# Patient Record
Sex: FEMALE | Race: WHITE | NOT HISPANIC OR LATINO | Employment: FULL TIME | URBAN - METROPOLITAN AREA
[De-identification: names, ages, dates, MRNs, and addresses within clinical notes are randomized per-mention and may not be internally consistent; named-entity substitution may affect disease eponyms.]

---

## 2022-11-14 ENCOUNTER — HOSPITAL ENCOUNTER (EMERGENCY)
Facility: HOSPITAL | Age: 37
Discharge: HOME | End: 2022-11-14
Attending: INTERNAL MEDICINE
Payer: MEDICAID

## 2022-11-14 VITALS
RESPIRATION RATE: 18 BRPM | HEIGHT: 60 IN | OXYGEN SATURATION: 99 % | WEIGHT: 152 LBS | HEART RATE: 69 BPM | TEMPERATURE: 98 F | BODY MASS INDEX: 29.84 KG/M2 | SYSTOLIC BLOOD PRESSURE: 150 MMHG | DIASTOLIC BLOOD PRESSURE: 102 MMHG

## 2022-11-14 DIAGNOSIS — R20.0 NUMBNESS AND TINGLING OF RIGHT ARM: ICD-10-CM

## 2022-11-14 DIAGNOSIS — M54.12 CERVICAL RADICULOPATHY: ICD-10-CM

## 2022-11-14 DIAGNOSIS — R20.2 NUMBNESS AND TINGLING OF RIGHT ARM: ICD-10-CM

## 2022-11-14 DIAGNOSIS — B34.9 ACUTE VIRAL SYNDROME: Primary | ICD-10-CM

## 2022-11-14 LAB
FLUAV AG UPPER RESP QL IA.RAPID: NOT DETECTED
FLUBV AG UPPER RESP QL IA.RAPID: NOT DETECTED
SARS-COV-2 RNA RESP QL NAA+PROBE: NOT DETECTED

## 2022-11-14 PROCEDURE — 96372 THER/PROPH/DIAG INJ SC/IM: CPT

## 2022-11-14 PROCEDURE — 99284 EMERGENCY DEPT VISIT MOD MDM: CPT

## 2022-11-14 PROCEDURE — 0240U COVID/FLU A&B PCR: CPT

## 2022-11-14 PROCEDURE — 63600175 PHARM REV CODE 636 W HCPCS

## 2022-11-14 RX ORDER — KETOROLAC TROMETHAMINE 30 MG/ML
30 INJECTION, SOLUTION INTRAMUSCULAR; INTRAVENOUS
Status: COMPLETED | OUTPATIENT
Start: 2022-11-14 | End: 2022-11-14

## 2022-11-14 RX ORDER — ALPRAZOLAM 1 MG/1
1 TABLET ORAL 2 TIMES DAILY PRN
COMMUNITY
Start: 2022-11-03

## 2022-11-14 RX ORDER — MIRTAZAPINE 30 MG/1
30 TABLET, FILM COATED ORAL NIGHTLY
COMMUNITY
Start: 2022-10-27

## 2022-11-14 RX ORDER — KETOROLAC TROMETHAMINE 10 MG/1
10 TABLET, FILM COATED ORAL EVERY 6 HOURS PRN
Qty: 20 TABLET | Refills: 0 | Status: SHIPPED | OUTPATIENT
Start: 2022-11-14 | End: 2022-11-19

## 2022-11-14 RX ORDER — NORETHINDRONE AND ETHINYL ESTRADIOL 1 MG-35MCG
1 KIT ORAL EVERY MORNING
Status: ON HOLD | COMMUNITY
Start: 2022-10-15 | End: 2023-06-19 | Stop reason: HOSPADM

## 2022-11-14 RX ORDER — BUPROPION HYDROCHLORIDE 150 MG/1
150 TABLET ORAL EVERY MORNING
COMMUNITY
Start: 2022-10-27

## 2022-11-14 RX ORDER — FLUTICASONE PROPIONATE 50 MCG
1 SPRAY, SUSPENSION (ML) NASAL 2 TIMES DAILY PRN
Qty: 15 G | Refills: 0 | Status: SHIPPED | OUTPATIENT
Start: 2022-11-14

## 2022-11-14 RX ORDER — CYCLOBENZAPRINE HCL 10 MG
10 TABLET ORAL 3 TIMES DAILY PRN
Qty: 15 TABLET | Refills: 0 | Status: SHIPPED | OUTPATIENT
Start: 2022-11-14 | End: 2022-11-19

## 2022-11-14 RX ORDER — CITALOPRAM 40 MG/1
40 TABLET, FILM COATED ORAL DAILY
COMMUNITY
Start: 2022-10-27

## 2022-11-14 RX ADMIN — KETOROLAC TROMETHAMINE 30 MG: 30 INJECTION, SOLUTION INTRAMUSCULAR at 05:11

## 2022-11-14 NOTE — Clinical Note
"Rin"Rivera Shaver was seen and treated in our emergency department on 11/14/2022.  She may return to work on 11/15/2022.  Covid/flu negative today.     If you have any questions or concerns, please don't hesitate to call.      Mariah MUSE    "

## 2022-11-14 NOTE — DISCHARGE INSTRUCTIONS
Thanks for letting us take care of you today!  It is our goal to give you courteous care and to keep you comfortable and informed, if you have any questions before you leave I will be happy to try and answer them.    Here is some advice after your visit:      Your visit in the emergency department is NOT definitive care - please follow-up with your primary care doctor and/or specialist within 1-2 days.  Please return if you have any worsening in your condition or if you have any other concerns.    If you had radiology exams like an XRAY or CT in the emergency Department the interpreation on them may be preliminary - there may be less time sensitive findings on the reports please obtain these reports within 24 hours from the hospital or by using your out on your mobile phone to access records.  Bring these to your primary care doctor and/or specialist for further review of incidental findings.    Please review any LAB WORK from your visit today with your primary care physician.

## 2022-11-14 NOTE — ED PROVIDER NOTES
"Encounter Date: 11/14/2022       History     Chief Complaint   Patient presents with    sinus congestion     Pt to er c/o sinus congestion onset yesterday. Pt also c/o fatigue and right arm tingling.     Patient is a 37 year old female who presents to ER with nasal congestion x 2 days. She denies sob, cough, fever, or chills. She also reports "heaviness" in her right arm that started after she took a nap in her car for work. She denies chest pain or sob.     The history is provided by the patient. No  was used.   Illness   The current episode started yesterday. The problem has been unchanged. The pain is at a severity of 0/10. Nothing relieves the symptoms. Nothing aggravates the symptoms. Associated symptoms include congestion and rhinorrhea. Pertinent negatives include no fever, no decreased vision, no nausea, no vomiting, no ear pain, no hearing loss, no mouth sores, no sore throat, no cough, no shortness of breath, no rash, no pain and no eye redness.   Review of patient's allergies indicates:   Allergen Reactions    Ondansetron Hives and Rash     Other reaction(s): Hives    Penicillins Rash     Past Medical History:   Diagnosis Date    Depression      No past surgical history on file.  No family history on file.     Review of Systems   Constitutional:  Negative for fever.   HENT:  Positive for congestion and rhinorrhea. Negative for ear pain, hearing loss, mouth sores and sore throat.    Eyes:  Negative for pain and redness.   Respiratory:  Negative for cough and shortness of breath.    Cardiovascular:  Negative for chest pain.   Gastrointestinal:  Negative for nausea and vomiting.   Genitourinary:  Negative for dysuria.   Musculoskeletal:  Negative for arthralgias, back pain and myalgias.   Skin:  Negative for rash.   Neurological:  Negative for weakness.   Hematological:  Does not bruise/bleed easily.   All other systems reviewed and are negative.    Physical Exam     Initial Vitals " [11/14/22 1552]   BP Pulse Resp Temp SpO2   (!) 150/102 69 18 97.7 °F (36.5 °C) 99 %      MAP       --         Physical Exam    Nursing note and vitals reviewed.  Constitutional: She appears well-developed and well-nourished.   HENT:   Head: Normocephalic.   Right Ear: Hearing and tympanic membrane normal.   Left Ear: Hearing and tympanic membrane normal.   Mouth/Throat: Uvula is midline, oropharynx is clear and moist and mucous membranes are normal.   Neck:   Normal range of motion.   Full passive range of motion without pain.     Cardiovascular:  Regular rhythm, normal heart sounds and normal pulses.           Pulmonary/Chest: Effort normal and breath sounds normal.   Musculoskeletal:      Right upper arm: Normal.      Left upper arm: Normal.      Right elbow: Normal.      Left elbow: Normal.      Right wrist: Normal.      Left wrist: Normal.      Right hand: Normal.      Left hand: Normal.      Cervical back: Full passive range of motion without pain and normal range of motion.     Lymphadenopathy:     She has no cervical adenopathy.   Neurological: She is alert. GCS eye subscore is 4. GCS verbal subscore is 5. GCS motor subscore is 6.   Skin: Skin is warm, dry and intact. Capillary refill takes less than 2 seconds.       ED Course   Procedures  Labs Reviewed   COVID/FLU A&B PCR - Normal    Narrative:     The Xpert Xpress SARS-CoV-2/FLU/RSV plus is a rapid, multiplexed real-time PCR test intended for the simultaneous qualitative detection and differentiation of SARS-CoV-2, Influenza A, Influenza B, and respiratory syncytial virus (RSV) viral RNA in either nasopharyngeal swab or nasal swab specimens.                Imaging Results              X-Ray Cervical Spine AP And Lateral (Final result)  Result time 11/14/22 17:17:13      Final result by Lisa Turner MD (11/14/22 17:17:13)                   Impression:      Some loss of the normal lordotic curve cervical spine possibly related to spasm otherwise  unremarkable      Electronically signed by: Lisa Morenolubna  Date:    11/14/2022  Time:    17:17               Narrative:    EXAMINATION:  XR CERVICAL SPINE AP LATERAL    CLINICAL HISTORY:  Anesthesia of skin    TECHNIQUE:  AP, lateral and open mouth views of the cervical spine were performed.    COMPARISON:  None    FINDINGS:  The vertebral body heights are well maintained.  There is some loss of the normal lordotic curve possibly related to spasm no fracture there is seen.  No dislocation is seen.  Odontoid lateral masses appear grossly unremarkable.  No soft tissue abnormality is seen.                                       Medications   ketorolac injection 30 mg (30 mg Intramuscular Given 11/14/22 3739)     Medical Decision Making:   Initial Assessment:   Awake and alert, NAD  Differential Diagnosis:   Cervical radiculopathy, URI, viral illness, covid, flu  Clinical Tests:   Lab Tests: Ordered and Reviewed  Radiological Study: Ordered and Reviewed  ED Management:  Patient to Er with nasal congestion x 2 days. She denies fever or chills. Also reports intermittent heaviness in right arm that started after she slept in her car. She has equal strength in both arms and no drift. Symptoms have improved since being in ER. Will dc home with flexeril and flonase. She is agreeable with plan and will follow up with her pcp.                         Clinical Impression:   Final diagnoses:  [R20.0, R20.2] Numbness and tingling of right arm  [B34.9] Acute viral syndrome (Primary)  [M54.12] Cervical radiculopathy        ED Disposition Condition    Discharge Stable          ED Prescriptions       Medication Sig Dispense Start Date End Date Auth. Provider    ketorolac (TORADOL) 10 mg tablet Take 1 tablet (10 mg total) by mouth every 6 (six) hours as needed for Pain. 20 tablet 11/14/2022 11/19/2022 Rosy Simmons NP    cyclobenzaprine (FLEXERIL) 10 MG tablet Take 1 tablet (10 mg total) by mouth 3 (three) times daily as needed  for Muscle spasms. 15 tablet 11/14/2022 11/19/2022 Rosy Simmons NP    fluticasone propionate (FLONASE) 50 mcg/actuation nasal spray 1 spray (50 mcg total) by Each Nostril route 2 (two) times daily as needed for Rhinitis. 15 g 11/14/2022 -- Rosy Simmons NP          Follow-up Information       Follow up With Specialties Details Why Contact Info    Lorrie Miner NP Family Medicine Schedule an appointment as soon as possible for a visit   [address]   [facility] [address]  [phone]               Rosy Simmons NP  11/14/22 6978

## 2023-06-12 ENCOUNTER — LAB VISIT (OUTPATIENT)
Dept: LAB | Facility: HOSPITAL | Age: 38
End: 2023-06-12
Attending: OBSTETRICS & GYNECOLOGY
Payer: MEDICAID

## 2023-06-12 DIAGNOSIS — Z01.818 OTHER SPECIFIED PRE-OPERATIVE EXAMINATION: ICD-10-CM

## 2023-06-12 DIAGNOSIS — Z01.818 OTHER SPECIFIED PRE-OPERATIVE EXAMINATION: Primary | ICD-10-CM

## 2023-06-12 LAB
BASOPHILS # BLD AUTO: 0.01 X10(3)/MCL
BASOPHILS NFR BLD AUTO: 0.1 %
EOSINOPHIL # BLD AUTO: 0.3 X10(3)/MCL (ref 0–0.9)
EOSINOPHIL NFR BLD AUTO: 4.3 %
ERYTHROCYTE [DISTWIDTH] IN BLOOD BY AUTOMATED COUNT: 12.4 % (ref 11.5–17)
HCT VFR BLD AUTO: 44.4 % (ref 37–47)
HGB BLD-MCNC: 14.8 G/DL (ref 12–16)
IMM GRANULOCYTES # BLD AUTO: 0.02 X10(3)/MCL (ref 0–0.04)
IMM GRANULOCYTES NFR BLD AUTO: 0.3 %
LYMPHOCYTES # BLD AUTO: 2.65 X10(3)/MCL (ref 0.6–4.6)
LYMPHOCYTES NFR BLD AUTO: 37.7 %
MCH RBC QN AUTO: 29.9 PG (ref 27–31)
MCHC RBC AUTO-ENTMCNC: 33.3 G/DL (ref 33–36)
MCV RBC AUTO: 89.7 FL (ref 80–94)
MONOCYTES # BLD AUTO: 0.42 X10(3)/MCL (ref 0.1–1.3)
MONOCYTES NFR BLD AUTO: 6 %
NEUTROPHILS # BLD AUTO: 3.63 X10(3)/MCL (ref 2.1–9.2)
NEUTROPHILS NFR BLD AUTO: 51.6 %
NRBC BLD AUTO-RTO: 0 %
PLATELET # BLD AUTO: 266 X10(3)/MCL (ref 130–400)
PMV BLD AUTO: 9.3 FL (ref 7.4–10.4)
RBC # BLD AUTO: 4.95 X10(6)/MCL (ref 4.2–5.4)
WBC # SPEC AUTO: 7.03 X10(3)/MCL (ref 4.5–11.5)

## 2023-06-12 PROCEDURE — 85025 COMPLETE CBC W/AUTO DIFF WBC: CPT

## 2023-06-12 PROCEDURE — 36415 COLL VENOUS BLD VENIPUNCTURE: CPT

## 2023-06-15 NOTE — DISCHARGE INSTRUCTIONS
Patient Education        Fallopian Tube Removal Discharge Instructions       What care is needed at home?   Be sure to wash your hands before and after touching your wound or dressing. You have dermabond (skin glue). You may wash your incision with soap and water. The dermabond (skin glue) may fall off within 1-2 weeks. You do not have to peel it off. Do not pick at it (infection prevention).  You may take showers 24 hours after your surgery. You should wash between your legs with soap and water very well to reduce your chance of infection.   Do not lift anything over 10 pounds (4.5 kg). Avoid activities like heavy lifting and hard exercise.   Do not use tampons, douche, or have sex for 2 weeks following your surgery or until told so by your doctor.  You can expect some bleeding from your vagina for a few weeks. You may use sanitary pads but not tampons.  Your bowel movements may take some time to get back to normal. Eat small meals high in fiber to avoid hard stools. Drink 6 to 8 glasses of water each day.  Try to walk each day. Start by walking a little more than you did the day before. Walking boosts blood flow and helps prevent lung, belly, and blood problems.  Talk with your doctor about safe sex as you can still be exposed to sexually transmitted diseases.  Use a small pillow to put pressure on your belly. The pressure can make you more comfortable when you cough, laugh, or do other actions.     What follow-up care is needed?   Be sure to keep your follow up visit.     Will physical activity be limited?   Rest for the first few days after the procedure. Talk to your doctor about the right amount of activity for you.    What problems could happen?   Infertility if both fallopian tubes were removed  Infection  Wound opening  Bleeding  Blood clots in your legs or lungs  Injury to nearby organs  Risk for an ectopic pregnancy    When do I need to call the doctor?   Signs of infection such as a fever of 100.4°F  (38°C) or higher, chills, pain with passing urine, wound that will not heal, or anal itching  Signs of wound infection such as swelling, redness, warmth around the wound; too much pain when touched; yellowish, greenish, or bloody discharge; foul smell coming from the cut site; cut site opens up  Lots of blood in your sanitary pads or more than 6 soaked pads per day  Upset stomach, throwing up, or very bad belly pain  No bowel movement after 3 days  You feel the need to pass urine but it will not come out even after 6 hours  Smelly, green, or dark yellow vaginal discharge  Feeling short of breath  Pain or swelling in one or both legs

## 2023-06-16 RX ORDER — LIDOCAINE HYDROCHLORIDE 10 MG/ML
1 INJECTION, SOLUTION EPIDURAL; INFILTRATION; INTRACAUDAL; PERINEURAL ONCE AS NEEDED
Status: CANCELLED | OUTPATIENT
Start: 2023-06-19 | End: 2034-11-14

## 2023-06-16 RX ORDER — SODIUM CHLORIDE 9 MG/ML
INJECTION, SOLUTION INTRAVENOUS CONTINUOUS
Status: CANCELLED | OUTPATIENT
Start: 2023-06-19

## 2023-06-18 PROBLEM — Z30.2 ADMISSION FOR STERILIZATION: Status: ACTIVE | Noted: 2023-06-18

## 2023-06-18 NOTE — H&P
History & Physical  Gynecology    SUBJECTIVE:     Chief Complaint/Reason for Admission: Laparoscopic Bilateral Tubal Fulguration    History of Present Illness:  Patient is a 38 y.o.  who presents with undesired future fertility and desires permanent sterilization.     No medications prior to admission.       Review of patient's allergies indicates:   Allergen Reactions    Amoxicillin     Penicillins Rash       Past medical history:  Past Medical History:   Diagnosis Date    Anxiety     Depression         Past surgical history:  Past Surgical History:   Procedure Laterality Date     SECTION      x3    FEMUR SURGERY          Social History     Tobacco Use    Smoking status: Every Day     Types: Vaping with nicotine    Smokeless tobacco: Never   Substance Use Topics    Alcohol use: Never    Drug use: Never       Review of Systems:  Constitutional: no fever or chills  Respiratory: no cough or shortness of breath  Cardiovascular: no chest pain or palpitations  Genitourinary: no hematuria or dysuria  Neuro:No headaches, dizziness or blurry vision.     OBJECTIVE:     Vital Signs (Most Recent):       Physical Exam:  General: well developed, well nourished  Lungs:  clear to auscultation bilaterally and normal respiratory effort  Cardiovascular: Heart with regular rate and rhythm.    Abdomen: soft and non tender  Back: no CVAT   Neuro: cranial nerves 2-12 grossly intact.  Pelvic:   NFEG w/o lesions noted. Small mobile UT, no masses noted  Extremities: normal ROM, no homans or palpable cords.     Laboratory:  Lab Results   Component Value Date    WBC 7.03 2023    HGB 14.8 2023    HCT 44.4 2023    MCV 89.7 2023     2023       Ultrasound:  No results found for this or any previous visit.          ASSESSMENT/PLAN:     Active Hospital Problems    Diagnosis  POA    *Admission for sterilization [Z30.2]  Not Applicable      Resolved Hospital Problems   No resolved problems to  display.       To OR for LBTF    Preop labs and studies ordered.    NPO 8 hours prior to surgery.     Medications reviewed.     Risks and benefits explained in detail to patient, including but not limited to damage to internal organs, including but not limited to bowel, bladder, uterus, tubes, ovaries, nerves, arteries, veins, possible need for blood transfusion.  Patient understands that for all practical purposes that this procedure in not reversible. There is also a risk of failing sterility and this can be as high as 1:200. All questions answered. Patient is aware of all risks and desires surgery. Consents signed.

## 2023-06-19 ENCOUNTER — ANESTHESIA (OUTPATIENT)
Dept: SURGERY | Facility: HOSPITAL | Age: 38
End: 2023-06-19
Payer: MEDICAID

## 2023-06-19 ENCOUNTER — ANESTHESIA EVENT (OUTPATIENT)
Dept: SURGERY | Facility: HOSPITAL | Age: 38
End: 2023-06-19
Payer: MEDICAID

## 2023-06-19 ENCOUNTER — HOSPITAL ENCOUNTER (OUTPATIENT)
Facility: HOSPITAL | Age: 38
Discharge: HOME | End: 2023-06-19
Attending: OBSTETRICS & GYNECOLOGY | Admitting: OBSTETRICS & GYNECOLOGY
Payer: MEDICAID

## 2023-06-19 DIAGNOSIS — Z30.2 ADMISSION FOR STERILIZATION: Primary | ICD-10-CM

## 2023-06-19 LAB
B-HCG UR QL: NEGATIVE
CTP QC/QA: YES

## 2023-06-19 PROCEDURE — 37000008 HC ANESTHESIA 1ST 15 MINUTES: Performed by: OBSTETRICS & GYNECOLOGY

## 2023-06-19 PROCEDURE — 63600175 PHARM REV CODE 636 W HCPCS: Performed by: STUDENT IN AN ORGANIZED HEALTH CARE EDUCATION/TRAINING PROGRAM

## 2023-06-19 PROCEDURE — D9220A PRA ANESTHESIA: ICD-10-PCS | Mod: ANES,,, | Performed by: STUDENT IN AN ORGANIZED HEALTH CARE EDUCATION/TRAINING PROGRAM

## 2023-06-19 PROCEDURE — 25000003 PHARM REV CODE 250: Performed by: NURSE ANESTHETIST, CERTIFIED REGISTERED

## 2023-06-19 PROCEDURE — 71000016 HC POSTOP RECOV ADDL HR: Performed by: OBSTETRICS & GYNECOLOGY

## 2023-06-19 PROCEDURE — 36000708 HC OR TIME LEV III 1ST 15 MIN: Performed by: OBSTETRICS & GYNECOLOGY

## 2023-06-19 PROCEDURE — 25000003 PHARM REV CODE 250: Performed by: STUDENT IN AN ORGANIZED HEALTH CARE EDUCATION/TRAINING PROGRAM

## 2023-06-19 PROCEDURE — D9220A PRA ANESTHESIA: Mod: ANES,,, | Performed by: STUDENT IN AN ORGANIZED HEALTH CARE EDUCATION/TRAINING PROGRAM

## 2023-06-19 PROCEDURE — 63600175 PHARM REV CODE 636 W HCPCS: Performed by: OBSTETRICS & GYNECOLOGY

## 2023-06-19 PROCEDURE — 81025 URINE PREGNANCY TEST: CPT | Performed by: OBSTETRICS & GYNECOLOGY

## 2023-06-19 PROCEDURE — D9220A PRA ANESTHESIA: ICD-10-PCS | Mod: CRNA,,, | Performed by: NURSE ANESTHETIST, CERTIFIED REGISTERED

## 2023-06-19 PROCEDURE — 25000003 PHARM REV CODE 250: Performed by: OBSTETRICS & GYNECOLOGY

## 2023-06-19 PROCEDURE — 71000015 HC POSTOP RECOV 1ST HR: Performed by: OBSTETRICS & GYNECOLOGY

## 2023-06-19 PROCEDURE — 36000709 HC OR TIME LEV III EA ADD 15 MIN: Performed by: OBSTETRICS & GYNECOLOGY

## 2023-06-19 PROCEDURE — D9220A PRA ANESTHESIA: Mod: CRNA,,, | Performed by: NURSE ANESTHETIST, CERTIFIED REGISTERED

## 2023-06-19 PROCEDURE — 37000009 HC ANESTHESIA EA ADD 15 MINS: Performed by: OBSTETRICS & GYNECOLOGY

## 2023-06-19 PROCEDURE — 71000033 HC RECOVERY, INTIAL HOUR: Performed by: OBSTETRICS & GYNECOLOGY

## 2023-06-19 PROCEDURE — 63600175 PHARM REV CODE 636 W HCPCS: Performed by: NURSE ANESTHETIST, CERTIFIED REGISTERED

## 2023-06-19 RX ORDER — DIPHENHYDRAMINE HYDROCHLORIDE 50 MG/ML
25 INJECTION INTRAMUSCULAR; INTRAVENOUS EVERY 6 HOURS PRN
Status: DISCONTINUED | OUTPATIENT
Start: 2023-06-19 | End: 2023-06-19 | Stop reason: HOSPADM

## 2023-06-19 RX ORDER — HYDROMORPHONE HYDROCHLORIDE 2 MG/ML
0.4 INJECTION, SOLUTION INTRAMUSCULAR; INTRAVENOUS; SUBCUTANEOUS EVERY 10 MIN PRN
Status: DISCONTINUED | OUTPATIENT
Start: 2023-06-19 | End: 2023-06-19 | Stop reason: HOSPADM

## 2023-06-19 RX ORDER — ONDANSETRON 4 MG/1
8 TABLET, ORALLY DISINTEGRATING ORAL EVERY 8 HOURS PRN
Status: DISCONTINUED | OUTPATIENT
Start: 2023-06-19 | End: 2023-06-19 | Stop reason: HOSPADM

## 2023-06-19 RX ORDER — BUPIVACAINE HYDROCHLORIDE AND EPINEPHRINE 5; 5 MG/ML; UG/ML
INJECTION, SOLUTION EPIDURAL; INTRACAUDAL; PERINEURAL
Status: DISCONTINUED | OUTPATIENT
Start: 2023-06-19 | End: 2023-06-19 | Stop reason: HOSPADM

## 2023-06-19 RX ORDER — OXYCODONE AND ACETAMINOPHEN 5; 325 MG/1; MG/1
1 TABLET ORAL EVERY 6 HOURS PRN
Qty: 5 TABLET | Refills: 0 | OUTPATIENT
Start: 2023-06-19 | End: 2023-08-04

## 2023-06-19 RX ORDER — PROCHLORPERAZINE EDISYLATE 5 MG/ML
5 INJECTION INTRAMUSCULAR; INTRAVENOUS EVERY 6 HOURS PRN
Status: DISCONTINUED | OUTPATIENT
Start: 2023-06-19 | End: 2023-06-19 | Stop reason: HOSPADM

## 2023-06-19 RX ORDER — MIDAZOLAM HYDROCHLORIDE 1 MG/ML
2 INJECTION INTRAMUSCULAR; INTRAVENOUS ONCE
Status: COMPLETED | OUTPATIENT
Start: 2023-06-19 | End: 2023-06-19

## 2023-06-19 RX ORDER — DIPHENHYDRAMINE HYDROCHLORIDE 50 MG/ML
INJECTION INTRAMUSCULAR; INTRAVENOUS
Status: DISCONTINUED | OUTPATIENT
Start: 2023-06-19 | End: 2023-06-19

## 2023-06-19 RX ORDER — SCOLOPAMINE TRANSDERMAL SYSTEM 1 MG/1
1 PATCH, EXTENDED RELEASE TRANSDERMAL
Status: DISCONTINUED | OUTPATIENT
Start: 2023-06-19 | End: 2023-06-19 | Stop reason: HOSPADM

## 2023-06-19 RX ORDER — GLYCOPYRROLATE 0.2 MG/ML
INJECTION INTRAMUSCULAR; INTRAVENOUS
Status: DISCONTINUED | OUTPATIENT
Start: 2023-06-19 | End: 2023-06-19

## 2023-06-19 RX ORDER — MEPERIDINE HYDROCHLORIDE 25 MG/ML
12.5 INJECTION INTRAMUSCULAR; INTRAVENOUS; SUBCUTANEOUS EVERY 10 MIN PRN
Status: DISCONTINUED | OUTPATIENT
Start: 2023-06-19 | End: 2023-06-19 | Stop reason: HOSPADM

## 2023-06-19 RX ORDER — KETOROLAC TROMETHAMINE 30 MG/ML
INJECTION, SOLUTION INTRAMUSCULAR; INTRAVENOUS
Status: DISCONTINUED | OUTPATIENT
Start: 2023-06-19 | End: 2023-06-19

## 2023-06-19 RX ORDER — SODIUM CHLORIDE, SODIUM LACTATE, POTASSIUM CHLORIDE, CALCIUM CHLORIDE 600; 310; 30; 20 MG/100ML; MG/100ML; MG/100ML; MG/100ML
INJECTION, SOLUTION INTRAVENOUS CONTINUOUS
Status: DISCONTINUED | OUTPATIENT
Start: 2023-06-19 | End: 2023-06-19 | Stop reason: HOSPADM

## 2023-06-19 RX ORDER — PROPOFOL 10 MG/ML
VIAL (ML) INTRAVENOUS
Status: DISCONTINUED | OUTPATIENT
Start: 2023-06-19 | End: 2023-06-19

## 2023-06-19 RX ORDER — IBUPROFEN 800 MG/1
800 TABLET ORAL EVERY 8 HOURS PRN
Qty: 30 TABLET | Refills: 2 | Status: SHIPPED | OUTPATIENT
Start: 2023-06-19

## 2023-06-19 RX ORDER — BUPIVACAINE HYDROCHLORIDE 5 MG/ML
INJECTION, SOLUTION EPIDURAL; INTRACAUDAL
Status: DISCONTINUED
Start: 2023-06-19 | End: 2023-06-19 | Stop reason: HOSPADM

## 2023-06-19 RX ORDER — ONDANSETRON HYDROCHLORIDE 2 MG/ML
INJECTION, SOLUTION INTRAMUSCULAR; INTRAVENOUS
Status: DISCONTINUED | OUTPATIENT
Start: 2023-06-19 | End: 2023-06-19

## 2023-06-19 RX ORDER — TRANEXAMIC ACID 100 MG/ML
INJECTION, SOLUTION INTRAVENOUS
Status: COMPLETED
Start: 2023-06-19 | End: 2023-06-19

## 2023-06-19 RX ORDER — METOCLOPRAMIDE HYDROCHLORIDE 5 MG/ML
10 INJECTION INTRAMUSCULAR; INTRAVENOUS EVERY 10 MIN PRN
Status: DISCONTINUED | OUTPATIENT
Start: 2023-06-19 | End: 2023-06-19 | Stop reason: HOSPADM

## 2023-06-19 RX ORDER — TRANEXAMIC ACID 100 MG/ML
INJECTION, SOLUTION INTRAVENOUS
Status: DISCONTINUED | OUTPATIENT
Start: 2023-06-19 | End: 2023-06-19

## 2023-06-19 RX ORDER — DEXAMETHASONE SODIUM PHOSPHATE 4 MG/ML
INJECTION, SOLUTION INTRA-ARTICULAR; INTRALESIONAL; INTRAMUSCULAR; INTRAVENOUS; SOFT TISSUE
Status: DISCONTINUED | OUTPATIENT
Start: 2023-06-19 | End: 2023-06-19

## 2023-06-19 RX ORDER — ACETAMINOPHEN 325 MG/1
650 TABLET ORAL EVERY 4 HOURS PRN
Status: DISCONTINUED | OUTPATIENT
Start: 2023-06-19 | End: 2023-06-19 | Stop reason: HOSPADM

## 2023-06-19 RX ORDER — PROCHLORPERAZINE EDISYLATE 5 MG/ML
5 INJECTION INTRAMUSCULAR; INTRAVENOUS EVERY 30 MIN PRN
Status: DISCONTINUED | OUTPATIENT
Start: 2023-06-19 | End: 2023-06-19

## 2023-06-19 RX ORDER — HYDROCODONE BITARTRATE AND ACETAMINOPHEN 10; 325 MG/1; MG/1
1 TABLET ORAL EVERY 4 HOURS PRN
Status: DISCONTINUED | OUTPATIENT
Start: 2023-06-19 | End: 2023-06-19 | Stop reason: HOSPADM

## 2023-06-19 RX ORDER — HYDROCODONE BITARTRATE AND ACETAMINOPHEN 5; 325 MG/1; MG/1
1 TABLET ORAL EVERY 4 HOURS PRN
Status: DISCONTINUED | OUTPATIENT
Start: 2023-06-19 | End: 2023-06-19 | Stop reason: HOSPADM

## 2023-06-19 RX ORDER — HYDROMORPHONE HYDROCHLORIDE 2 MG/ML
1 INJECTION, SOLUTION INTRAMUSCULAR; INTRAVENOUS; SUBCUTANEOUS EVERY 6 HOURS PRN
Status: DISCONTINUED | OUTPATIENT
Start: 2023-06-19 | End: 2023-06-19 | Stop reason: HOSPADM

## 2023-06-19 RX ORDER — FENTANYL CITRATE 50 UG/ML
INJECTION, SOLUTION INTRAMUSCULAR; INTRAVENOUS
Status: DISCONTINUED | OUTPATIENT
Start: 2023-06-19 | End: 2023-06-19

## 2023-06-19 RX ORDER — METHOCARBAMOL 100 MG/ML
1000 INJECTION, SOLUTION INTRAMUSCULAR; INTRAVENOUS ONCE
Status: COMPLETED | OUTPATIENT
Start: 2023-06-19 | End: 2023-06-19

## 2023-06-19 RX ORDER — ROCURONIUM BROMIDE 10 MG/ML
INJECTION, SOLUTION INTRAVENOUS
Status: DISCONTINUED | OUTPATIENT
Start: 2023-06-19 | End: 2023-06-19

## 2023-06-19 RX ORDER — LIDOCAINE HYDROCHLORIDE 10 MG/ML
INJECTION, SOLUTION EPIDURAL; INFILTRATION; INTRACAUDAL; PERINEURAL
Status: DISCONTINUED | OUTPATIENT
Start: 2023-06-19 | End: 2023-06-19

## 2023-06-19 RX ADMIN — SODIUM CHLORIDE, POTASSIUM CHLORIDE, SODIUM LACTATE AND CALCIUM CHLORIDE: 600; 310; 30; 20 INJECTION, SOLUTION INTRAVENOUS at 09:06

## 2023-06-19 RX ADMIN — FENTANYL CITRATE 25 MCG: 50 INJECTION, SOLUTION INTRAMUSCULAR; INTRAVENOUS at 10:06

## 2023-06-19 RX ADMIN — KETOROLAC TROMETHAMINE 30 MG: 30 INJECTION, SOLUTION INTRAMUSCULAR at 10:06

## 2023-06-19 RX ADMIN — ONDANSETRON 4 MG: 2 INJECTION INTRAMUSCULAR; INTRAVENOUS at 10:06

## 2023-06-19 RX ADMIN — MIDAZOLAM HYDROCHLORIDE 2 MG: 1 INJECTION, SOLUTION INTRAMUSCULAR; INTRAVENOUS at 09:06

## 2023-06-19 RX ADMIN — DEXAMETHASONE SODIUM PHOSPHATE 8 MG: 4 INJECTION, SOLUTION INTRA-ARTICULAR; INTRALESIONAL; INTRAMUSCULAR; INTRAVENOUS; SOFT TISSUE at 09:06

## 2023-06-19 RX ADMIN — SCOPOLAMINE 1 PATCH: 1 PATCH TRANSDERMAL at 08:06

## 2023-06-19 RX ADMIN — DIPHENHYDRAMINE HYDROCHLORIDE 12.5 MG: 50 INJECTION INTRAMUSCULAR; INTRAVENOUS at 10:06

## 2023-06-19 RX ADMIN — LIDOCAINE HYDROCHLORIDE 50 MG: 10 INJECTION, SOLUTION EPIDURAL; INFILTRATION; INTRACAUDAL; PERINEURAL at 09:06

## 2023-06-19 RX ADMIN — SUGAMMADEX 200 MG: 100 INJECTION, SOLUTION INTRAVENOUS at 10:06

## 2023-06-19 RX ADMIN — GLYCOPYRROLATE 0.2 MG: 0.2 INJECTION INTRAMUSCULAR; INTRAVENOUS at 09:06

## 2023-06-19 RX ADMIN — METHOCARBAMOL 1000 MG: 100 INJECTION INTRAMUSCULAR; INTRAVENOUS at 11:06

## 2023-06-19 RX ADMIN — HYDROCODONE BITARTRATE AND ACETAMINOPHEN 1 TABLET: 5; 325 TABLET ORAL at 12:06

## 2023-06-19 RX ADMIN — PROPOFOL 200 MG: 10 INJECTION, EMULSION INTRAVENOUS at 09:06

## 2023-06-19 RX ADMIN — FENTANYL CITRATE 25 MCG: 50 INJECTION, SOLUTION INTRAMUSCULAR; INTRAVENOUS at 09:06

## 2023-06-19 RX ADMIN — TRANEXAMIC ACID 1000 MG: 100 INJECTION, SOLUTION INTRAVENOUS at 10:06

## 2023-06-19 RX ADMIN — ROCURONIUM BROMIDE 40 MG: 50 INJECTION INTRAVENOUS at 09:06

## 2023-06-19 NOTE — TRANSFER OF CARE
Anesthesia Transfer of Care Note    Patient: Rin Shaver    Procedure(s) Performed: Procedure(s) (LRB):  LIGATION, FALLOPIAN TUBE, LAPAROSCOPIC Bilateral (Bilateral)    Patient location: PACU    Anesthesia Type: general    Transport from OR: Transported from OR on room air with adequate spontaneous ventilation    Post pain: adequate analgesia    Post assessment: no apparent anesthetic complications    Post vital signs: stable    Level of consciousness: sedated    Nausea/Vomiting: no nausea/vomiting    Complications: none    Transfer of care protocol was followed      Last vitals:   Visit Vitals  /66   Pulse 70   Temp 36.7 °C (98.1 °F)   Resp 16   Ht 5' (1.524 m)   Wt 68.3 kg (150 lb 9.2 oz)   LMP 04/26/2023 (Approximate)   SpO2 100%   Breastfeeding No   BMI 29.41 kg/m²

## 2023-06-19 NOTE — PLAN OF CARE
Pt is [id number] score 10-she meets criteria for phase2 care per dr velasquez-to rm 6 via bed w/ tricia

## 2023-06-19 NOTE — DISCHARGE SUMMARY
[facility]  Discharge Note/Brief Operative Note     SUMMARY     Surgery Date: 6/19/2023     Surgeon(s) and Role:     * Nathaniel Logan DO - Primary    Assisting Surgeon: None    Pre-op Diagnosis:  Sterilization [Z30.2]    Post-op Diagnosis:  Post-Op Diagnosis Codes:     * Sterilization [Z30.2]    Procedure(s) (LRB):  LIGATION, FALLOPIAN TUBE, LAPAROSCOPIC Bilateral (Bilateral)    Anesthesia: General    Findings/Key Components:  Normal pelvic anatomy.  Omental adhesion partially midway between the symphysis pubis and umbilicus in the midline.  Filmy adhesions with no bowel involved.  Also the right-hand side of the uterus was more adherent to the pelvic sidewall on the right-hand side.    Estimated Blood Loss: 1cc         Specimens:   Specimen (24h ago, onward)      None            Discharge Note    SUMMARY     Admit Date: 6/19/2023    Discharge Date and Time: No discharge date for patient encounter.    Attending Physician: Nathaniel Logan DO     Discharge Provider: Nathaniel Logan    Final Diagnosis: Post-Op Diagnosis Codes:     * Sterilization [Z30.2]    Disposition: Patient tolerated the procedure well. To Home or Self Care, discharged in good condition    Discharge meds: Ibuprofen 800gm and Norco 5/325, see printed scripts.    Follow Up/Patient Instructions:    Follow-up Information       Nathaniel Logan DO Follow up in 2 week(s).    Specialty: Obstetrics and Gynecology  Why: Post-op, patient already has appointment scheduled  Contact information:  [address] [facility]  [address]  [facility] [id number] [phone]                          Follow up in office at scheduled post op visit. Pelvic rest for 2 weeks.     Medications:  Reconciled Home Medications:   Current Discharge Medication List        START taking these medications    Details   ibuprofen (ADVIL,MOTRIN) 800 MG tablet Take 1 tablet (800 mg total) by mouth every 8 (eight) hours as needed for Pain (Pain  and cramping).  Qty: 30 tablet, Refills: 2      oxyCODONE-acetaminophen (PERCOCET) 5-325 mg per tablet Take 1 tablet by mouth every 6 (six) hours as needed for Pain (pain not relieved by ibuprofen).  Qty: 5 tablet, Refills: 0    Comments: Quantity prescribed more than 7 day supply? No           CONTINUE these medications which have NOT CHANGED    Details   ALPRAZolam (XANAX) 1 MG tablet Take 1 mg by mouth 2 (two) times daily as needed.      buPROPion (WELLBUTRIN XL) 150 MG TB24 tablet Take 150 mg by mouth every morning.      citalopram (CELEXA) 40 MG tablet Take 40 mg by mouth once daily.      mirtazapine (REMERON) 30 MG tablet Take 30 mg by mouth every evening.      fluticasone propionate (FLONASE) 50 mcg/actuation nasal spray 1 spray (50 mcg total) by Each Nostril route 2 (two) times daily as needed for Rhinitis.  Qty: 15 g, Refills: 0           STOP taking these medications       medroxyprogesterone (DEPO-SUBQ PROVERA) 104 mg/0.65 mL injection Comments:   Reason for Stopping:         NORTREL 1/35, 28, 1-35 mg-mcg per tablet Comments:   Reason for Stopping:             Discharge Procedure Orders   Diet general     Pelvic Rest   Order Comments: For two weeks     Call MD for:  temperature >100.4     Call MD for:  persistent nausea and vomiting     Call MD for:  severe uncontrolled pain     Call MD for:  difficulty breathing, headache or visual disturbances     Call MD for:  redness, tenderness, or signs of infection (pain, swelling, redness, odor or green/yellow discharge around incision site)     Call MD for:  hives     Call MD for:  persistent dizziness or light-headedness     Activity as tolerated

## 2023-06-19 NOTE — ANESTHESIA PROCEDURE NOTES
Intubation    Date/Time: 6/19/2023 9:48 AM  Performed by: Nery Daugherty CRNA  Authorized by: Dudley Rodriguez MD     Intubation:     Induction:  Intravenous    Intubated:  Postinduction    Mask Ventilation:  Easy mask    Attempts:  1    Attempted By:  CRNA    Method of Intubation:  Direct    Blade:  Robertson 2    Laryngeal View Grade: Grade I - full view of cords      Difficult Airway Encountered?: No      Complications:  None    Airway Device:  Oral endotracheal tube    Airway Device Size:  7.0    Style/Cuff Inflation:  Cuffed (inflated to minimal occlusive pressure)    Tube secured:  20    Secured at:  The lips    Placement Verified By:  Capnometry    Complicating Factors:  None    Findings Post-Intubation:  BS equal bilateral and atraumatic/condition of teeth unchanged

## 2023-06-19 NOTE — OP NOTE
OPERATIVE NOTE       SUMMARY      Surgery Date: 2023      SURGEON: Nathaniel Logan    ASSISTANT: Staff Scrub    PREOP DIAGNOSIS:  39yo  with undesired future fertility and desires permanent sterilization.    POSTOP DIAGNOSIS:  Same as above    PROCEDURES:  Laparoscopic Bilateral Tubal Fulguration    ANESTHESIA:  GETA    FINDINGS/KEY COMPONENTS: Normal pelvic anatomy.  Omental adhesion partially midway between the symphysis pubis and umbilicus in the midline.  Filmy adhesions with no bowel involved.  Also the right-hand side of the uterus was more adherent to the pelvic sidewall on the right-hand side.    PROCEDURE IN DETAIL:  After ensuring an informed consent the patient was taken to the operating room where general anesthesia was administered.  She was placed in a dorsal lithotomy position employing the adjustable Devyn stirrups.  She was prepped and draped in the usual sterile fashion.  Bladder was drained of urine with a flexible catheter.  Time-out was completed.  A sponge stick was placed in the vagina and attention was turned to the infraumbilical region where a 5 mm vertical skin incision was made within the umbilical fold and a 5 mm trocar and port were placed under direct visualization using the Ethicon bladeless system.  No evidence of entry damage noted.  Pneumoperitoneum was obtained with CO2 gas to maximum pressure of 15 mmHg.  A 2nd port was placed in the midline just above the symphysis pubis it was placed under direct visualization with no evidence of entry damage noted.  The above findings were noted.  Both incision sites were pre prepped with 1.5 cc of 0.5% Marcaine with epinephrine.  Attention was turned to the left fallopian tube and the Kleppinger was used to fulgurate the tube to complete desiccation in 3 contiguous spots started in the mid ampullary region going towards the fimbriated end.  No viable tissue noted in between the burn sites.  Excellent hemostasis was  appreciated.  The same procedure was carried out on the contralateral side without difficulty.  At this time all instruments removed and the CO2 gas was allowed to escape.  The trocars were removed and the incisions were reapproximated using 4-O Monocryl in a subcuticular fashion and then covered with a thin layer of Dermabond.  The sponge stick was removed from the vagina and the patient was cleansed of all blood and Betadine, taken out of dorsal lithotomy position awoken from anesthesia and taken to recovery room in stable condition.  Lap sponge instrument needle counts were correct x3.    ESTIMATED BLOOD LOSS: 1cc    Fluids:800cc    Urine Output: 200cc    COMPLICATIONS: none    PATHOLOGY:   Specimens (From admission, onward)      None

## 2023-06-19 NOTE — PROGRESS NOTES
Op site without dressing x 2 trocar sites, surrounding area soft and warm to the touch, color WDL.

## 2023-06-19 NOTE — INTERVAL H&P NOTE
I have seen the patient and reviewed this note, and there is no change in history and physical since the last exam. Urine pregnancy test is negative. Consents are signed and on the chart. Patients questions have been answered. She wants to proceed with permanent sterilization. Will proceed with laparoscopic bilateral tubal ligation.

## 2023-06-19 NOTE — ANESTHESIA POSTPROCEDURE EVALUATION
"Reason For Visit  Arina Nassar is a 76year old right handed male patient with a chief complaint of memory loss, word finding , anxiety, LBP. :  services not used. A chaperone is not applicable. He is unaccompanied. Referred By: Dr Ronald Lowry   Address:. [address]  [place], [facility]  . .. Phone #:. [phone]. Fax #:. [phone], [phone]. Quality    Communication CI communication of results to PCP: Dr. Ronald Lowry. History of Present Illness  77 yo RH retired  with h/o HTN, CARLOS, BPH and lumbar degenerative spine disease, with 3 year h/o memory concern (late onset age 70). Several RF for mood changes/ anxiety, LBP, CARLOS, fatigue. --low bach pain improved with duloxetine 60 mg a day  --less anxious on duloxetine as well  --word finding delays ongoing issue  --BP high 163/90 mmHg today  --reviewed mri brain 1 year follow up and results on formal np testing as below  --very concerned about his status, that is not what he "" was previously capable of\""   --still independent in all IADL and ADL-- helps with grandkids  --reports compliance with CPAP    Personal history:  He is  with 1 child and had grandchildren. He is retired , graduate school, , lives at home with spouse, an IM physician actively practicing. No family h/o dementia. Cognitive   Not knowing date/time: yes (occ). Not knowing location: no ().    change in short-term memory: yes (frq). Difficulty remembering recent events: no ().    difficulty with word finding: yes (frq). Forgetting names: yes (occ). Misplacing/losing items: yes (frq). Repeating stories/questions: yes (occ). Getting lost in familiar place: no (). Difficulty following a conversation or TV program: no ().    difficulty following instructions: yes (occ). Difficulty with basic arithmetic: no ().     No poor judgement (dangerous actions, excess spending, etc.): no " Anesthesia Post Evaluation    Patient: Rin Shaver    Procedure(s) Performed: Procedure(s) (LRB):  LIGATION, FALLOPIAN TUBE, LAPAROSCOPIC Bilateral (Bilateral)    Final Anesthesia Type: general      Patient location during evaluation: PACU  Patient participation: Yes- Able to Participate  Level of consciousness: awake and alert  Post-procedure vital signs: reviewed and stable  Pain management: adequate  Airway patency: patent    PONV status at discharge: No PONV  Anesthetic complications: no      Respiratory status: unassisted  Hydration status: euvolemic  Follow-up not needed.          Vitals Value Taken Time   /70 06/19/23 1230   Temp 36.7 °C (98.1 °F) 06/19/23 1110   Pulse 72 06/19/23 1230   Resp 18 06/19/23 1230   SpO2 99 % 06/19/23 1230         Event Time   Out of Recovery 11:19:00         Pain/Harvinder Score: Pain Rating Prior to Med Admin: 6 (6/19/2023 12:04 PM)  Harvinder Score: 10 (6/19/2023 12:30 PM)         ().       Behavior   Delusions: none. Hallucinations: none. Agitation/Aggression: occasional.   Depression: none. Anxiety: frequent. Elation/Euphoria: none. Apathy: none. Disinhibition: none. Irritability: frequent. Motor Behavior: rare. Sleep: none. Appetite: none. Instrumental ADL's   Driving: yes. Public Transportation: Independent. Shopping: Independent. Household Chores: Independent. Cooking: Independent. Paying bills: Independent. Medication management: Independent. Telephone: Independent. Laundry: Independent. Socializing: Independent. Personal ADL's   Dressing: Independent. Bathing: Independent. Personal Grooming: Independent. Toileting: Independent. Eating: Independent. Review of Systems    Const: feeling tired . Skin: Normal.   Eyes: dryness of the eyes. ENT: Normal.   CV: Normal.   Resp: Normal.   GI: Normal.   Endo: Normal.   : feelings of urinary urgency and nocturia. Musc: back pain . back pain better. Neuro: numbness and loss of balance. Psych: anxiety. Allergies   1. No Known Drug Allergies   Recorded By: Jack Roach; 4/27/2017 4:00:27 PM    Current Meds   1. ALPRAZolam 0.5 MG Oral Tablet; 1 tab daily; Therapy: (Méndez Gavia) to Recorded   2. Aspirin 81 MG TABS; 1 daily; Therapy: (Méndez Gavia) to Recorded   3. CoQ-10 100 MG Oral Capsule Extended Release; as directed; Therapy: (Méndez Gavia) to Recorded   4. DULoxetine HCl - 60 MG Oral Capsule Delayed Release Particles; TAKE 1 CAPSULE   BEDTIME; Therapy: [id number] to (Evaluate:[id number])  Requested for: 01Sep2017; Last   Rx:01Sep2017 Ordered   5. Fish Oil CAPS; 1tab daily; Therapy: (Darrell Hernandez) to Recorded   6. Labetalol HCl - 200 MG Oral Tablet; TAKE 1 TABLET TWICE DAILY; Therapy: [id number] to ([id number]); Last Rx:[id number] Ordered   7. Omega 3 500 500 MG Oral Capsule; 1 tab daily;    Therapy: (Méndez Gavia) to Recorded   8. Terazosin HCl - 10 MG Oral Capsule; TAKE 1 CAPSULE AT BEDTIME NIGHTLY; Therapy: (Lindajo Jay) to Recorded   9. Valsartan 80 MG Oral Tablet; TAKE 1 TABLET QHS; Last Rx:2017 Ordered   10. Vitamin C ER 1000 MG Oral Tablet Extended Release; TAKE 1 TABLET DAILY; Therapy: (Lindajo Jay) to Recorded   11. Vitamin D3 5000 UNIT Oral Tablet; TAKE 1 TABLET DAILY; Therapy: (Odilia rCaig) to Recorded    Active Problems   1. Anxiety (F41.9)   2. Benign essential HTN (I10)   3. Cerebral microvascular disease (I67.9)   4. Dyslipidemia (E78.5)   5. Lumbosacral spondylosis with radiculopathy (M47.27)   6. Macrocytosis (D75.89)   7. Memory loss (R41.3)   8. Mood change (F39)   9. Obesity (BMI 30-39.9) (E66.9)   10. CARLOS on CPAP (G47.33,Z99.89)   11. Sleep disturbance (G47.9)   12. Tiredness (R53.83)    Surgical History   1. History of Appendectomy   2. History of Cholecystectomy   3. History of Sinus Surgery    Family History  Mother    1. Family history of   Father    2. Family history of COPD, mild   3. Family history of    4. Family history of heart disease (Z82.49)  Sister    11. Family history of arthritis (Z82.61)   6. Family history of malignant neoplasm of breast (Z80.3)  Cousin    7.  Family history of heart disease (Z82.49)    Social History   Â· Activities: Swimming   Â· Drinks coffee   Â· Engages in hobby   Â· Exercises occasionally (Z78.9)   Â· Language difficulty (F80.9)   Â· Lives with wife   Â·    Â· Minimum alcohol consumption   Â· No guns in home   Â· No illicit drug use   Â· Non-smoker (Z78.9)   Â· One child   Â· Retired    Vitals  Signs   Recorded: [id number] 02:16PM   Height: 6 ft 2 in  Weight: 264 lb 12.32 oz  BMI Calculated: 33.99  BSA Calculated: 8.63  Systolic: 759, LUE, Sitting  Diastolic: 90, LUE, Sitting  Heart Rate: 78    Testing Scores  Test Scores    [id number]   MMSE 28 27   MoCA  20   Geriatric Depression Scale 6 7     Physical Exam "  GENERAL EXAM  Constitutional: No acute distress, well appearing. Reports reduction in low back pain, more comfortable  Cardiovascular: Auscultation of heart: no murmur. NEUROLOGIC EXAM    MENTAL STATUS  see testing scores  good historian   concerned about his cognition   feels like "" average in not good for him\""  insight preserved   occasional word finding difficulties noted during exam  calmer    CRANIAL NERVES  II: Pupils equal and reactive to light. III,IV, VI: Extraocular movements full. V: Facial sensation normal.  VII: Facial sensation and symmetry normal.  VIII: hearing normal.  IX, X: Palate elevation normal.  XI: SCM and trapezius strength: normal.  XII: tongue protruded in midline. MUSCULOSKELETAL EXAM  Muscle tone in upper and lower extremities: normal-- improved-- had massage and therapy for low back pain and muscle pain  Muscle strength in upper and lower extremities 5/5. COORDINATION  Finger to Nose normal bilaterally, no ataxia. Extrapyramidal: none  Tremor: no tremor detected, but notes sometimes has tremor in hands    SENSATION    vibration sensation loss up to both hips    REFLEXES  UE:Biceps,Triceps, Brachioradialis: bilaterally tr+  LE:Patella bilaterally 0+ , Ankle Jerk: bilaterally 0  Plantar responses: bilaterally flexor    GAIT AND STATION  Gait: steady, no assisted devices, less back pain and no antalgia  Freezing of gait: absent  Postural instability on a pull test: absent  Romberg: sways without falling  Tandem: abnormal                   Results/Data    LAbs: 17-- uric acid 7.4 ( mild H), T chol 213 (H),  (H)  TSH 1.69, free T3 3.1, free T4 0.7 (low nl), homocysteine 10, vit D 66.98, B12 522 (nl), hgbA1c 5.8, Creatinine 1.1, BUN 16, Hgb 14.6, Hct 43.7 nl    Neuropsychological testin17----> done by dr. Jefrey Seip -- only language impaired (expressive language: confrontational naming and verbal fluency).  Otherwise learning, memory, visuospatial skills, " attention, processing speed, working memory, were intact. Mood was significant depression and anxiety. Language deficits were of uncertain etiology (nonnative [place] speaker). [id number] 12:06PM   MRI BRAIN WO CON   MRI BRAIN WO CON: Accession #    [id number]    EXAM: Noncontrast MRI examination of the brain    CLINICAL INDICATION: Memory loss. TECHNIQUE: Multiplanar, multiecho images were obtained of the brain without the infusion of   intravenous contrast.    COMPARISON: 07/11/2016. FINDINGS: There is age-appropriate cerebral and cerebellar atrophy without midline shift or   mass effect. There are no abnormal collections of intra-axial or extra-axial blood or fluid. T1-weighted sagittal images show the midline structures to be intact. The craniovertebral   and cervical medullary junctions are normal. There is no restricted intracranial diffusion. Intracranial flow voids are unremarkable. Scattered regions of abnormal high T2 signal are   noted within the supratentorial white matter, deep gray nuclei bilaterally, kimber, and right   cerebellar hemisphere. There is no MRI evidence of intracranial hemorrhage, mass, or acute   infarction. The mastoid air cells and paranasal sinuses are well aerated. IMPRESSION:  1. There are no acute intracranial abnormalities. Age-appropriate cerebral and cerebellar atrophy   are identified with chronic supratentorial white matter ischemic demyelination. ****  F I N A L  ****    Transcribed By: LEV   09/14/17 12:35 pm    Dictated By:            Jeferson Hollis    Electronically Reviewed and Approved By:           Jeferson Hollis  09/14/17 12:38  pm   Assessment   1. CARLOS on CPAP (G47.33,Z99.89)   2. Cerebral microvascular disease (I67.9)   3. Anxiety (F41.9)   4. Memory loss (R41.3)    Impression  75 yo RH retired  with h/o HTN, BPH, lumbar degenerative spine disease and treated CARLOS and 3 years of forgetting, associated anxiety. At this time the formal NP testing mostly concerning for language issues, although uncertain if affected by [place] s a second language or true cognitive impairment. Remaining domains scored within average range. This is perceived by patient as a failure due to self assessment as of great drop from prior level, which may very well be true and if this process in ongoing then 1 year f/u Neuropsychological testing may be helpful. On the other hand the brain MRI changes consisting of a moderate burden of chronic ischemic microvascular changes have progressed between 2016 and 2017 are concerning and would warrant better attention and BP control. The vacular brain disease may be cause of cognitive decline and could lead to vascular dementia in the future, hence vascular RF modification would be critical here. Plan   Â· Donepezil HCl - 5 MG Oral Tablet; TAKE 1 TABLET BEDTIME         Other Recommendations:     continue duloxetine 60 mg a day    improve BP control, BP < 140/90 mmHg    weight reduction and increase physical activity    trial of donepezil 5 mg at bedtime, script printed, more of a supportive therapy due to disturbance in function with cognitive symptoms     redo NP testing in 1 year    MRI brain in 1 year also may be considered for a follow up on progression of vascular brain disease     continue antiplatelet agent ASA  mg a day    patient reports no issues with lipids    asked to share note with dr. Yash Scott [phone]    Return to Clinic   6 months. Time    Total face to face time spent with patient 40 minutes. Greater than 50% of the total time was spent counseling and/or coordinating care.       Signatures   Electronically signed by : Farhana Noyola MD; Nov 2 2017  5:08PM CST (Author)

## 2023-06-19 NOTE — ANESTHESIA PREPROCEDURE EVALUATION
2023  Rin Shaver is a 38 y.o., female.    Other Medical History   Depression Anxiety     Surgical History   SECTION FEMUR SURGERY       Pre-op Assessment    I have reviewed the Patient Summary Reports.     I have reviewed the Nursing Notes. I have reviewed the NPO Status.   I have reviewed the Medications.     Review of Systems  Anesthesia Hx:   Denies Personal Hx of Anesthesia complications.   Cardiovascular:   Exercise tolerance: good    Pulmonary:  Pulmonary Normal    Hepatic/GI:  Hepatic/GI Normal    Neurological:  Neurology Normal    Psych:   Psychiatric History depression          Physical Exam  General: Well nourished, Cooperative and Alert    Airway:  Mallampati: II   Mouth Opening: Normal  TM Distance: Normal  Tongue: Normal    Dental:  Intact    Chest/Lungs:  Normal Respiratory Rate        Anesthesia Plan  Type of Anesthesia, risks & benefits discussed:    Anesthesia Type: Gen ETT  Intra-op Monitoring Plan: Standard ASA Monitors  Post Op Pain Control Plan: multimodal analgesia  Induction:  IV  Informed Consent: Informed consent signed with the Patient and all parties understand the risks and agree with anesthesia plan.  All questions answered.   ASA Score: 2  Day of Surgery Review of History & Physical: H&P Update referred to the surgeon/provider.    Ready For Surgery From Anesthesia Perspective.     .

## 2023-06-20 VITALS
HEIGHT: 60 IN | RESPIRATION RATE: 18 BRPM | OXYGEN SATURATION: 99 % | SYSTOLIC BLOOD PRESSURE: 140 MMHG | HEART RATE: 72 BPM | DIASTOLIC BLOOD PRESSURE: 70 MMHG | BODY MASS INDEX: 29.56 KG/M2 | TEMPERATURE: 98 F | WEIGHT: 150.56 LBS

## 2023-08-03 PROCEDURE — 99284 EMERGENCY DEPT VISIT MOD MDM: CPT

## 2023-08-04 ENCOUNTER — HOSPITAL ENCOUNTER (EMERGENCY)
Facility: HOSPITAL | Age: 38
Discharge: HOME | End: 2023-08-04
Attending: EMERGENCY MEDICINE
Payer: MEDICAID

## 2023-08-04 VITALS
RESPIRATION RATE: 18 BRPM | OXYGEN SATURATION: 97 % | BODY MASS INDEX: 29.84 KG/M2 | TEMPERATURE: 98 F | HEART RATE: 67 BPM | SYSTOLIC BLOOD PRESSURE: 115 MMHG | WEIGHT: 152 LBS | DIASTOLIC BLOOD PRESSURE: 63 MMHG | HEIGHT: 60 IN

## 2023-08-04 DIAGNOSIS — M25.552 ACUTE HIP PAIN, LEFT: Primary | ICD-10-CM

## 2023-08-04 PROCEDURE — 63600175 PHARM REV CODE 636 W HCPCS: Performed by: EMERGENCY MEDICINE

## 2023-08-04 PROCEDURE — 96372 THER/PROPH/DIAG INJ SC/IM: CPT | Performed by: EMERGENCY MEDICINE

## 2023-08-04 RX ORDER — OXYCODONE AND ACETAMINOPHEN 5; 325 MG/1; MG/1
1 TABLET ORAL EVERY 8 HOURS PRN
Qty: 6 TABLET | Refills: 0 | Status: SHIPPED | OUTPATIENT
Start: 2023-08-04

## 2023-08-04 RX ORDER — KETOROLAC TROMETHAMINE 30 MG/ML
60 INJECTION, SOLUTION INTRAMUSCULAR; INTRAVENOUS
Status: COMPLETED | OUTPATIENT
Start: 2023-08-04 | End: 2023-08-04

## 2023-08-04 RX ADMIN — KETOROLAC TROMETHAMINE 60 MG: 30 INJECTION, SOLUTION INTRAMUSCULAR; INTRAVENOUS at 12:08

## 2023-08-04 NOTE — ED PROVIDER NOTES
"Encounter Date: 8/3/2023       History     Chief Complaint   Patient presents with    Back Pain     " My sciatic nerve pain on the left side. I went to [facility] last night the toradol helped for couple of hours." Did not fill Rx given at [facility].     38-year-old female complains of pain in the left hip radiating to the left lateral thigh for the last 3 days.  She states she went to [facility] yesterday and got a shot of Toradol which helped for few hours but now the pain is back.  The pain is not in her lumbar spine or her buttocks.  She has no weakness or numbness.  She states she does not usually get this type of pain.  She did have femur surgery and a pelvic fracture in  and has a miguel angel in her femur.  She states prior to the pain starting, she did go up and down the stairs quite a few times but did not have any falls or injuries.        Review of patient's allergies indicates:   Allergen Reactions    Amoxicillin     Hydrocodone Nausea And Vomiting    Penicillins Rash     Past Medical History:   Diagnosis Date    Anxiety     Depression      Past Surgical History:   Procedure Laterality Date     SECTION      x3    FEMUR SURGERY      LAPAROSCOPIC LIGATION OF FALLOPIAN TUBE Bilateral 2023    Procedure: LIGATION, FALLOPIAN TUBE, LAPAROSCOPIC Bilateral;  Surgeon: Nathaniel Logan DO;  Location: [facility] OR;  Service: OB/GYN;  Laterality: Bilateral;     Family History   Problem Relation Age of Onset    Hypertension Mother     Heart attack Father     Heart disease Paternal Grandfather      Social History     Tobacco Use    Smoking status: Every Day     Current packs/day: 0.00     Types: Vaping with nicotine     Start date: 2022    Smokeless tobacco: Never   Substance Use Topics    Alcohol use: Never    Drug use: Never     Review of Systems   Musculoskeletal:  Positive for arthralgias.   All other systems reviewed and are negative.      Physical Exam     Initial Vitals [23 2340] "   BP Pulse Resp Temp SpO2   115/63 67 18 98.2 °F (36.8 °C) 97 %      MAP       --         Physical Exam    Nursing note and vitals reviewed.  Constitutional: She appears well-developed and well-nourished. She is not diaphoretic. No distress.   HENT:   Head: Normocephalic and atraumatic.   Mouth/Throat: Oropharynx is clear and moist.   Eyes: Conjunctivae are normal. Pupils are equal, round, and reactive to light.   Neck: Neck supple.   Cardiovascular:  Normal rate, regular rhythm, normal heart sounds and intact distal pulses.           Pulmonary/Chest: Breath sounds normal. No respiratory distress. She has no wheezes. She has no rhonchi. She has no rales.   Abdominal: Abdomen is soft. She exhibits no distension. There is no abdominal tenderness. There is no guarding.   Musculoskeletal:         General: No tenderness or edema. Normal range of motion.      Cervical back: Neck supple.      Comments: Tender over the left hip with internal and external rotation worsening the pain.  She is able to straighten the leg and internally and externally rotate about 45° each way.  Pedal pulses 2+.  Normal sensation throughout.  Normal strength with leg extension, flexion, and great toe dorsiflexion.  Nontender to the buttocks and nontender to the spine.     Neurological: She is alert and oriented to person, place, and time.   Skin: Skin is warm and dry. Capillary refill takes less than 2 seconds. No rash noted.   Psychiatric: She has a normal mood and affect. Thought content normal.         ED Course   Procedures  Labs Reviewed - No data to display       Imaging Results              X-Ray Femur 2 View Left (In process)  Result time 08/04/23 00:38:13   Procedure changed from X-Ray Hip 2 or 3 views Left (with Pelvis when performed)                 X-Rays:   Independently Interpreted Readings:   Other Readings:  Left femur x-ray shows no acute finding, widening of the pubic symphysis noted which is presumably due to injury in  2008    Medications   ketorolac injection 60 mg (60 mg Intramuscular Given 8/4/23 0053)     Medical Decision Making:   Initial Assessment:   38-year-old female complains of pain in the left hip radiating to the left lateral thigh for the last 3 days.  She states she went to [facility] yesterday and got a shot of Toradol which helped for few hours but now the pain is back.  The pain is not in her lumbar spine or her buttocks.  She has no weakness or numbness.  She states she does not usually get this type of pain.  She did have femur surgery and a pelvic fracture in 2008 and has a miguel angel in her femur.  She states prior to the pain starting, she did go up and down the stairs quite a few times but did not have any falls or injuries.        Differential Diagnosis:   Differential diagnosis includes but is not limited to sciatica, DJD of the hip, hardware failure, musculoskeletal strain  Clinical Tests:   Radiological Study: Reviewed  ED Management:  Patient was seen and evaluated in the Emergency Department with history, physical exam and x-rays.  Her pain seems to localize to the hip.  She has no buttock or back pain so do not really suspect sciatica.  I suspect the pain is DJD of the hip and related to her going up and down the stairs multiple times and having some musculoskeletal strain.  I have given her a shot of Toradol.  She is asking for pain medication and she is on chronic benzodiazepine therapy.  I will give her just a few Percocet and recommend she follow-up with the PCP as needed.  I have discussed with her the pros, cons, and risks with this pain medication and she is verbalized her understanding.                          Clinical Impression:   Final diagnoses:  [M25.552] Acute hip pain, left (Primary)        ED Disposition Condition    Discharge Stable          ED Prescriptions       Medication Sig Dispense Start Date End Date Auth. Provider    oxyCODONE-acetaminophen (PERCOCET) 5-325 mg per tablet  Take 1 tablet by mouth every 8 (eight) hours as needed for Pain. 6 tablet 8/4/2023 -- Chanel Rand MD          Follow-up Information       Follow up With Specialties Details Why Contact Info    Lorrie Miner, NP Family Medicine Schedule an appointment as soon as possible for a visit   [address]   [facility] [address]  [phone]               Chanel Rand MD  08/04/23 0220

## 2023-08-04 NOTE — Clinical Note
"Rin"Rivera Shaver was seen and treated in our emergency department on 8/3/2023.  She may return to work on 08/07/2023.       If you have any questions or concerns, please don't hesitate to call.      Chanel Rand MD"

## 2023-08-12 ENCOUNTER — HOSPITAL ENCOUNTER (EMERGENCY)
Facility: HOSPITAL | Age: 38
Discharge: HOME | End: 2023-08-12
Attending: STUDENT IN AN ORGANIZED HEALTH CARE EDUCATION/TRAINING PROGRAM
Payer: MEDICAID

## 2023-08-12 VITALS
DIASTOLIC BLOOD PRESSURE: 83 MMHG | TEMPERATURE: 98 F | RESPIRATION RATE: 18 BRPM | HEART RATE: 76 BPM | HEIGHT: 60 IN | WEIGHT: 151 LBS | OXYGEN SATURATION: 97 % | BODY MASS INDEX: 29.64 KG/M2 | SYSTOLIC BLOOD PRESSURE: 126 MMHG

## 2023-08-12 DIAGNOSIS — M25.552 LEFT HIP PAIN: Primary | ICD-10-CM

## 2023-08-12 LAB — B-HCG SERPL QL: NEGATIVE

## 2023-08-12 PROCEDURE — 63600175 PHARM REV CODE 636 W HCPCS: Performed by: STUDENT IN AN ORGANIZED HEALTH CARE EDUCATION/TRAINING PROGRAM

## 2023-08-12 PROCEDURE — 81025 URINE PREGNANCY TEST: CPT | Performed by: STUDENT IN AN ORGANIZED HEALTH CARE EDUCATION/TRAINING PROGRAM

## 2023-08-12 PROCEDURE — 99284 EMERGENCY DEPT VISIT MOD MDM: CPT

## 2023-08-12 PROCEDURE — 96372 THER/PROPH/DIAG INJ SC/IM: CPT | Performed by: STUDENT IN AN ORGANIZED HEALTH CARE EDUCATION/TRAINING PROGRAM

## 2023-08-12 RX ORDER — METHOCARBAMOL 750 MG/1
750 TABLET, FILM COATED ORAL 3 TIMES DAILY PRN
Qty: 40 TABLET | Refills: 0 | Status: SHIPPED | OUTPATIENT
Start: 2023-08-12 | End: 2023-08-22

## 2023-08-12 RX ORDER — METHOCARBAMOL 750 MG/1
750 TABLET, FILM COATED ORAL 3 TIMES DAILY PRN
Qty: 40 TABLET | Refills: 0 | Status: SHIPPED | OUTPATIENT
Start: 2023-08-12 | End: 2023-08-12 | Stop reason: SDUPTHER

## 2023-08-12 RX ORDER — MEDROXYPROGESTERONE ACETATE 104 MG/.65ML
INJECTION, SUSPENSION SUBCUTANEOUS
COMMUNITY
Start: 2023-06-27

## 2023-08-12 RX ORDER — LISDEXAMFETAMINE DIMESYLATE 40 MG/1
40 CAPSULE ORAL EVERY MORNING
COMMUNITY
Start: 2023-07-19

## 2023-08-12 RX ORDER — DEXAMETHASONE SODIUM PHOSPHATE 4 MG/ML
8 INJECTION, SOLUTION INTRA-ARTICULAR; INTRALESIONAL; INTRAMUSCULAR; INTRAVENOUS; SOFT TISSUE
Status: COMPLETED | OUTPATIENT
Start: 2023-08-12 | End: 2023-08-12

## 2023-08-12 RX ADMIN — DEXAMETHASONE SODIUM PHOSPHATE 8 MG: 4 INJECTION, SOLUTION INTRA-ARTICULAR; INTRALESIONAL; INTRAMUSCULAR; INTRAVENOUS; SOFT TISSUE at 09:08

## 2023-08-13 NOTE — ED PROVIDER NOTES
Encounter Date: 2023       History     Chief Complaint   Patient presents with    Hip Pain     L hip pain- seen here last wk for same thing. Seen PCP since and they have sent her to PT and rx for ibuprofen but pt states she has slipped onto that side 2 nights ago. Pt states she is out of muscle relaxer and states those helped her. States she would like toradol shot or something to help with the pain. States it is hard to walk on.      HPI    38-year-old female presents emergency department for evaluation of left hip pain.  Patient states she was seen here about a week ago, sent home with some Percocet and Robaxin.  States that the Robaxin help plenty but ran out.  She is requesting some more of that as well as possible Toradol steroids.  States she followed up with her PCP and is planning on going into physical therapy when they returned her phone call.  Denies any recent fall or trauma.  States the pain is the same was today she was originally seen here.  States it was improving until she put some weight on her left leg while trying not to fall 2 days ago.    Review of patient's allergies indicates:   Allergen Reactions    Amoxicillin     Penicillins Rash     Past Medical History:   Diagnosis Date    Anxiety     Depression      Past Surgical History:   Procedure Laterality Date     SECTION      x3    FEMUR SURGERY      LAPAROSCOPIC LIGATION OF FALLOPIAN TUBE Bilateral 2023    Procedure: LIGATION, FALLOPIAN TUBE, LAPAROSCOPIC Bilateral;  Surgeon: Nathaniel Logan DO;  Location: [facility];  Service: OB/GYN;  Laterality: Bilateral;     Family History   Problem Relation Age of Onset    Hypertension Mother     Heart attack Father     Heart disease Paternal Grandfather      Social History     Tobacco Use    Smoking status: Every Day     Current packs/day: 0.00     Types: Vaping with nicotine     Start date: 2022    Smokeless tobacco: Never   Substance Use Topics    Alcohol use: Never    Drug  use: Never     Review of Systems   Constitutional:  Negative for fever.   Respiratory:  Negative for cough and shortness of breath.    Cardiovascular:  Negative for chest pain.   Gastrointestinal:  Negative for abdominal pain.   Musculoskeletal:         Left hip pain   All other systems reviewed and are negative.      Physical Exam     Initial Vitals [08/12/23 2044]   BP Pulse Resp Temp SpO2   126/83 76 18 97.9 °F (36.6 °C) 97 %      MAP       --         Physical Exam    Nursing note and vitals reviewed.  Constitutional: She appears well-developed and well-nourished. No distress.   Cardiovascular:  Normal rate and regular rhythm.           Pulmonary/Chest: Breath sounds normal. No respiratory distress.   Abdominal: Abdomen is soft. There is no abdominal tenderness.   Musculoskeletal:         General: Tenderness (generalized tenderness to the left lateral hip with muscle tenderness appreciated.) present. Normal range of motion.     Neurological: She is alert and oriented to person, place, and time. She has normal strength.   Skin: Skin is warm. Capillary refill takes less than 2 seconds. No rash noted.         ED Course   Procedures  Labs Reviewed   PREGNANCY TEST, URINE RAPID - Normal          Imaging Results    None          Medications   dexAMETHasone injection 8 mg (has no administration in time range)     Medical Decision Making:   Differential Diagnosis:   Musculoskeletal pain, arthritis, DJD, sciatica  ED Management:  No need to repeat any imaging.  We will give a shot of dexamethasone and prescribed some muscle relaxers.  She is to do physical therapy.  I did review the old images.     Medical Decision Making  Problems Addressed:  Left hip pain: chronic illness or injury    Amount and/or Complexity of Data Reviewed  External Data Reviewed: radiology.    Risk  Prescription drug management.                           Clinical Impression:   Final diagnoses:  [M25.552] Left hip pain (Primary)        ED Disposition  Condition    Discharge Stable          ED Prescriptions       Medication Sig Dispense Start Date End Date Auth. Provider    methocarbamoL (ROBAXIN) 750 MG Tab Take 1 tablet (750 mg total) by mouth 3 (three) times daily as needed (pain). 40 tablet 8/12/2023 8/22/2023 Heron Cali MD          Follow-up Information       Follow up With Specialties Details Why Contact Info    Lorrie Miner, NP Family Medicine Schedule an appointment as soon as possible for a visit   PO   [facility] [address]  [phone]      [facility] - Emergency Dept Emergency Medicine Go to  If symptoms worsen [id number]  [facility] [address] [phone]             Heron Cali MD  08/12/23 5800

## 2024-02-06 ENCOUNTER — HOSPITAL ENCOUNTER (EMERGENCY)
Facility: HOSPITAL | Age: 39
Discharge: HOME | End: 2024-02-07
Attending: STUDENT IN AN ORGANIZED HEALTH CARE EDUCATION/TRAINING PROGRAM
Payer: MEDICAID

## 2024-02-06 VITALS
RESPIRATION RATE: 18 BRPM | TEMPERATURE: 99 F | OXYGEN SATURATION: 99 % | SYSTOLIC BLOOD PRESSURE: 118 MMHG | HEIGHT: 60 IN | WEIGHT: 145 LBS | BODY MASS INDEX: 28.47 KG/M2 | DIASTOLIC BLOOD PRESSURE: 77 MMHG | HEART RATE: 74 BPM

## 2024-02-06 DIAGNOSIS — J02.0 STREP PHARYNGITIS: Primary | ICD-10-CM

## 2024-02-06 PROCEDURE — 99283 EMERGENCY DEPT VISIT LOW MDM: CPT

## 2024-02-06 PROCEDURE — 87651 STREP A DNA AMP PROBE: CPT | Performed by: STUDENT IN AN ORGANIZED HEALTH CARE EDUCATION/TRAINING PROGRAM

## 2024-02-06 PROCEDURE — 0240U COVID/FLU A&B PCR: CPT | Performed by: STUDENT IN AN ORGANIZED HEALTH CARE EDUCATION/TRAINING PROGRAM

## 2024-02-06 NOTE — Clinical Note
"Rin"Rivera Shaver was seen and treated in our emergency department on 2/6/2024.  She may return to work on 02/12/2024.       If you have any questions or concerns, please don't hesitate to call.      Heron Cali MD"

## 2024-02-07 LAB
APPEARANCE UR: CLEAR
B-HCG SERPL QL: NEGATIVE
BILIRUB UR QL STRIP.AUTO: NEGATIVE
COLOR UR AUTO: NORMAL
FLUAV AG UPPER RESP QL IA.RAPID: NOT DETECTED
FLUBV AG UPPER RESP QL IA.RAPID: NOT DETECTED
GLUCOSE UR QL STRIP.AUTO: NEGATIVE
KETONES UR QL STRIP.AUTO: NEGATIVE
LEUKOCYTE ESTERASE UR QL STRIP.AUTO: NEGATIVE
NITRITE UR QL STRIP.AUTO: NEGATIVE
PH UR STRIP.AUTO: 7 [PH]
PROT UR QL STRIP.AUTO: NEGATIVE
RBC UR QL AUTO: NEGATIVE
SARS-COV-2 RNA RESP QL NAA+PROBE: NOT DETECTED
SP GR UR STRIP.AUTO: 1.01 (ref 1–1.03)
STREP A PCR (OHS): DETECTED
UROBILINOGEN UR STRIP-ACNC: 0.2

## 2024-02-07 PROCEDURE — 81003 URINALYSIS AUTO W/O SCOPE: CPT | Performed by: STUDENT IN AN ORGANIZED HEALTH CARE EDUCATION/TRAINING PROGRAM

## 2024-02-07 PROCEDURE — 63600175 PHARM REV CODE 636 W HCPCS: Performed by: STUDENT IN AN ORGANIZED HEALTH CARE EDUCATION/TRAINING PROGRAM

## 2024-02-07 PROCEDURE — 81025 URINE PREGNANCY TEST: CPT | Performed by: STUDENT IN AN ORGANIZED HEALTH CARE EDUCATION/TRAINING PROGRAM

## 2024-02-07 PROCEDURE — 96372 THER/PROPH/DIAG INJ SC/IM: CPT | Performed by: STUDENT IN AN ORGANIZED HEALTH CARE EDUCATION/TRAINING PROGRAM

## 2024-02-07 RX ORDER — CLINDAMYCIN HYDROCHLORIDE 150 MG/1
450 CAPSULE ORAL EVERY 8 HOURS
Qty: 90 CAPSULE | Refills: 0 | Status: SHIPPED | OUTPATIENT
Start: 2024-02-07 | End: 2024-02-17

## 2024-02-07 RX ORDER — DEXAMETHASONE SODIUM PHOSPHATE 4 MG/ML
8 INJECTION, SOLUTION INTRA-ARTICULAR; INTRALESIONAL; INTRAMUSCULAR; INTRAVENOUS; SOFT TISSUE
Status: COMPLETED | OUTPATIENT
Start: 2024-02-07 | End: 2024-02-07

## 2024-02-07 RX ADMIN — DEXAMETHASONE SODIUM PHOSPHATE 8 MG: 4 INJECTION, SOLUTION INTRA-ARTICULAR; INTRALESIONAL; INTRAMUSCULAR; INTRAVENOUS; SOFT TISSUE at 12:02

## 2024-02-07 NOTE — ED PROVIDER NOTES
Encounter Date: 2024       History     Chief Complaint   Patient presents with    Sore Throat    Nasal Congestion     The history is provided by the patient.   Sore Throat   This is a new problem. The current episode started yesterday. The problem has been unchanged. There has been no fever. Associated symptoms include congestion, headaches, swollen glands and trouble swallowing. Pertinent negatives include no abdominal pain, coughing, diarrhea, drooling, neck pain, shortness of breath or vomiting. She has had no exposure to strep. She has tried nothing for the symptoms.     Review of patient's allergies indicates:   Allergen Reactions    Amoxicillin     Penicillins Rash     Past Medical History:   Diagnosis Date    Anxiety     Depression      Past Surgical History:   Procedure Laterality Date     SECTION      x3    FEMUR SURGERY      LAPAROSCOPIC LIGATION OF FALLOPIAN TUBE Bilateral 2023    Procedure: LIGATION, FALLOPIAN TUBE, LAPAROSCOPIC Bilateral;  Surgeon: Nathaniel Logan DO;  Location: [facility];  Service: OB/GYN;  Laterality: Bilateral;     Family History   Problem Relation Age of Onset    Hypertension Mother     Heart attack Father     Heart disease Paternal Grandfather      Social History     Tobacco Use    Smoking status: Every Day     Types: Vaping with nicotine     Start date: 2022    Smokeless tobacco: Never   Substance Use Topics    Alcohol use: Never    Drug use: Never     Review of Systems   Constitutional:  Negative for fever.   HENT:  Positive for congestion, sore throat and trouble swallowing. Negative for drooling.    Respiratory:  Negative for cough and shortness of breath.    Cardiovascular:  Negative for chest pain.   Gastrointestinal:  Negative for abdominal pain, constipation, diarrhea, nausea and vomiting.   Genitourinary:  Negative for dysuria.   Musculoskeletal:  Negative for back pain and neck pain.   Skin:  Negative for rash.   Neurological:  Positive for  headaches. Negative for weakness.   Hematological:  Does not bruise/bleed easily.   All other systems reviewed and are negative.      Physical Exam     Initial Vitals [02/06/24 2332]   BP Pulse Resp Temp SpO2   118/77 74 18 98.7 °F (37.1 °C) 99 %      MAP       --         Physical Exam    Nursing note and vitals reviewed.  Constitutional: She appears well-developed and well-nourished. No distress.   HENT:   Mouth/Throat: No oropharyngeal exudate.   Oropharyngeal erythema with postnasal drip.  Plus one tonsils bilaterally.   Cardiovascular:  Normal rate and regular rhythm.           Pulmonary/Chest: Breath sounds normal. No respiratory distress. She has no wheezes. She has no rhonchi. She has no rales.   Abdominal: Abdomen is soft. There is no abdominal tenderness. There is no rebound and no guarding.   Musculoskeletal:         General: No tenderness. Normal range of motion.     Neurological: She is alert and oriented to person, place, and time. She has normal strength.   Skin: Skin is warm. Capillary refill takes less than 2 seconds.         ED Course   Procedures  Labs Reviewed   STREP GROUP A BY PCR - Abnormal; Notable for the following components:       Result Value    STREP A PCR ([facility]) Detected (*)     All other components within normal limits    Narrative:     The Xpert Xpress Strep A test is a rapid, qualitative in vitro diagnostic test for the detection of Streptococcus pyogenes (Group A ß-hemolytic Streptococcus, Strep A) in throat swab specimens from patients with signs and symptoms of pharyngitis.     PREGNANCY TEST, URINE RAPID - Normal   URINALYSIS, REFLEX TO URINE CULTURE - Normal   COVID/FLU A&B PCR          Imaging Results    None          Medications   dexAMETHasone injection 8 mg (has no administration in time range)     Medical Decision Making  differential diagnosis  Viral syndrome, strep, COVID, flu,  as well as multiple other possible etiologies      Problems Addressed:  Strep pharyngitis: acute  illness or injury    Amount and/or Complexity of Data Reviewed  Labs: ordered. Decision-making details documented in ED Course.    Risk  Prescription drug management.               ED Course as of 02/07/24 0033 Wed Feb 07, 2024 0030 STREP A PCR ([facility])(!): Detected [BS]   0031 Patient positive for strep.  States severe allergy to penicillins and amoxicillin.  Will treat with clindamycin.  Lost give her a dose of Decadron to help with pain. [BS]   0032 hCG Qualitative, Urine: Negative [BS]      ED Course User Index  [BS] Heron Cali MD                           Clinical Impression:  Final diagnoses:  [J02.0] Strep pharyngitis (Primary)          ED Disposition Condition    Discharge Stable          ED Prescriptions       Medication Sig Dispense Start Date End Date Auth. Provider    clindamycin (CLEOCIN) 150 MG capsule Take 3 capsules (450 mg total) by mouth every 8 (eight) hours. for 10 days 90 capsule 2/7/2024 2/17/2024 Heron Cali MD          Follow-up Information       Follow up With Specialties Details Why Contact Info    Lorrie Miner NP Family Medicine Schedule an appointment as soon as possible for a visit   PO   [facility] [address]  [phone]      [facility] - Emergency Dept Emergency Medicine Go to  If symptoms worsen [id number]  [facility] [address] [phone]             Heron Cali MD  02/07/24 0033

## 2024-11-21 ENCOUNTER — HOSPITAL ENCOUNTER (EMERGENCY)
Facility: HOSPITAL | Age: 39
Discharge: HOME | End: 2024-11-21
Attending: EMERGENCY MEDICINE
Payer: MEDICAID

## 2024-11-21 VITALS
SYSTOLIC BLOOD PRESSURE: 130 MMHG | BODY MASS INDEX: 24.54 KG/M2 | OXYGEN SATURATION: 100 % | TEMPERATURE: 99 F | HEIGHT: 60 IN | RESPIRATION RATE: 18 BRPM | WEIGHT: 125 LBS | HEART RATE: 68 BPM | DIASTOLIC BLOOD PRESSURE: 86 MMHG

## 2024-11-21 DIAGNOSIS — L30.9 DERMATITIS: ICD-10-CM

## 2024-11-21 DIAGNOSIS — R21 RASH AND NONSPECIFIC SKIN ERUPTION: Primary | ICD-10-CM

## 2024-11-21 PROCEDURE — 99283 EMERGENCY DEPT VISIT LOW MDM: CPT

## 2024-11-21 RX ORDER — TRIAMCINOLONE ACETONIDE 1 MG/G
CREAM TOPICAL 2 TIMES DAILY
Qty: 80 G | Refills: 0 | Status: SHIPPED | OUTPATIENT
Start: 2024-11-21 | End: 2024-12-01

## 2024-11-21 NOTE — Clinical Note
"Rin"Rivera Shaver was seen and treated in our emergency department on 11/21/2024.  She may return to work on 11/22/2024.       If you have any questions or concerns, please don't hesitate to call.      Holley Morris, DAMON"

## 2024-11-21 NOTE — ED PROVIDER NOTES
Encounter Date: 2024       History     Chief Complaint   Patient presents with    Rash     Pt concerned at rash to both hands that has a burning feeling     Patient complains of a rash to her bilateral hands times 3 days.  Patient states that rash feels like it is burning.  Patient denies any itching, drainage, crusting, or fever.  Patient denies any new foods, lotions, medicines, or products.  Patient states that she does have to wash her hands frequently at work due to exposure to a glue.  Patient states that rash is constant.  History of depression.    The history is provided by the patient.   Rash   This is a new problem. The current episode started several days ago. Progression since onset: Intermittently. The pain has been Constant since onset. Pertinent negatives include no blisters, no itching and no weeping.     Review of patient's allergies indicates:   Allergen Reactions    Amoxicillin     Penicillins Rash     Past Medical History:   Diagnosis Date    Anxiety     Depression      Past Surgical History:   Procedure Laterality Date     SECTION      x3    FEMUR SURGERY      LAPAROSCOPIC LIGATION OF FALLOPIAN TUBE Bilateral 2023    Procedure: LIGATION, FALLOPIAN TUBE, LAPAROSCOPIC Bilateral;  Surgeon: Nathaniel Logan DO;  Location: [facility];  Service: OB/GYN;  Laterality: Bilateral;     Family History   Problem Relation Name Age of Onset    Hypertension Mother      Heart attack Father      Heart disease Paternal Grandfather       Social History     Tobacco Use    Smoking status: Every Day     Types: Vaping with nicotine     Start date: 2022    Smokeless tobacco: Never   Substance Use Topics    Alcohol use: Never    Drug use: Never     Review of Systems   Constitutional: Negative.  Negative for chills and fever.   HENT: Negative.     Eyes: Negative.    Respiratory: Negative.     Cardiovascular: Negative.    Gastrointestinal: Negative.    Endocrine: Negative.    Genitourinary:  Negative.    Musculoskeletal: Negative.    Skin:  Positive for rash. Negative for itching.   Allergic/Immunologic: Negative.    Neurological: Negative.    Hematological: Negative.    Psychiatric/Behavioral: Negative.     All other systems reviewed and are negative.      Physical Exam     Initial Vitals [11/21/24 1358]   BP Pulse Resp Temp SpO2   130/86 68 18 98.6 °F (37 °C) 100 %      MAP       --         Physical Exam    Nursing note and vitals reviewed.  Constitutional: She appears well-developed and well-nourished. No distress.   HENT:   Head: Normocephalic and atraumatic. Mouth/Throat: Oropharynx is clear and moist.   Eyes: Conjunctivae and EOM are normal. Pupils are equal, round, and reactive to light.   Neck: Neck supple.   Normal range of motion.  Cardiovascular:  Normal rate, regular rhythm, normal heart sounds and intact distal pulses.           Pulmonary/Chest: Breath sounds normal. No respiratory distress. She has no wheezes.   Musculoskeletal:         General: No tenderness or edema. Normal range of motion.      Cervical back: Normal range of motion and neck supple.     Neurological: She is alert and oriented to person, place, and time. She has normal strength. GCS score is 15. GCS eye subscore is 4. GCS verbal subscore is 5. GCS motor subscore is 6.   Skin: Skin is warm and dry. Rash (There is an erythematous, dry appearing, mildly cracked appearing rash to bilateral dorsal hands that is contact dermatitis appearing.) noted.   Psychiatric: She has a normal mood and affect. Thought content normal.         ED Course   Procedures  Labs Reviewed - No data to display       Imaging Results    None          Medications - No data to display  Medical Decision Making  Patient complains of a rash to her bilateral hands times 3 days.  Patient states that rash feels like it is burning.  Patient denies any itching, drainage, crusting, or fever.  Patient denies any new foods, lotions, medicines, or products.  Patient  states that she does have to wash her hands frequently at work due to exposure to a glue.  Patient states that rash is constant.  History of depression.    The history is provided by the patient.   Rash   This is a new problem. The current episode started several days ago. Progression since onset: Intermittently. The pain has been Constant since onset. Pertinent negatives include no blisters, no itching and no weeping.       Amount and/or Complexity of Data Reviewed  Discussion of management or test interpretation with external provider(s): Differential diagnosis (including but not limited to):   Judging by the patient's chief complaint and pertinent history, the patient has the following possible differential diagnoses, including but not limited to the following.  Some of these are deemed to be lower likelihood and some more likely based on my physical exam and history combined with possible lab work and/or imaging studies.   Please see the pertinent studies, and refer to the HPI.  Some of these diagnoses will take further evaluation to fully rule out, perhaps as an outpatient and the patient was encouraged to follow up when discharged for more comprehensive evaluation.  Rash, contact dermatitis, allergic reaction, eczema, dry skin  Patient appears to have a contact dermatitis type rash and or dry skin to bilateral hands.  We will send a prescription for steroid cream for patient.  Patient was ED return precautions.      Risk  Prescription drug management.                                      Clinical Impression:  Final diagnoses:  [R21] Rash and nonspecific skin eruption (Primary)  [L30.9] Dermatitis          ED Disposition Condition    Discharge Stable          ED Prescriptions       Medication Sig Dispense Start Date End Date Auth. Provider    triamcinolone acetonide 0.1% (KENALOG) 0.1 % cream Apply topically 2 (two) times daily. for 10 days 80 g 11/21/2024 12/1/2024 Holley Morris FNP          Follow-up  Information       Follow up With Specialties Details Why Contact Info    Lorrie Miner, NP Family Medicine In 3 days  PO   [facility] [address]  [phone]               Holley Morris, P  11/21/24 5960

## 2025-01-08 ENCOUNTER — HOSPITAL ENCOUNTER (EMERGENCY)
Facility: HOSPITAL | Age: 40
Discharge: HOME | End: 2025-01-08
Attending: EMERGENCY MEDICINE
Payer: MEDICAID

## 2025-01-08 VITALS
DIASTOLIC BLOOD PRESSURE: 95 MMHG | SYSTOLIC BLOOD PRESSURE: 127 MMHG | RESPIRATION RATE: 20 BRPM | WEIGHT: 117.19 LBS | TEMPERATURE: 98 F | OXYGEN SATURATION: 98 % | HEIGHT: 60 IN | BODY MASS INDEX: 23.01 KG/M2 | HEART RATE: 82 BPM

## 2025-01-08 DIAGNOSIS — R05.9 COUGH, UNSPECIFIED TYPE: Primary | ICD-10-CM

## 2025-01-08 LAB
FLUAV AG UPPER RESP QL IA.RAPID: NOT DETECTED
FLUBV AG UPPER RESP QL IA.RAPID: NOT DETECTED
RSV A 5' UTR RNA NPH QL NAA+PROBE: NOT DETECTED
SARS-COV-2 RNA RESP QL NAA+PROBE: NOT DETECTED

## 2025-01-08 PROCEDURE — 0241U COVID/RSV/FLU A&B PCR: CPT | Performed by: EMERGENCY MEDICINE

## 2025-01-08 PROCEDURE — 99282 EMERGENCY DEPT VISIT SF MDM: CPT

## 2025-01-08 NOTE — ED PROVIDER NOTES
Encounter Date: 2025       History     Chief Complaint   Patient presents with    Cough     Pt c/o cough, congestion and sneezing onset two weeks ago.     39-year-old female history of anxiety/depression presents with cough, congestion, runny nose and sneezing.  Patient has had these symptoms for about 2 weeks.  They have not gotten any better gotten any worse.  No fever, pleuritic chest pain, shortness of breath.  Patient says that her kids recently had RSV.  She is requesting viral testing.    The history is provided by the patient.     Review of patient's allergies indicates:   Allergen Reactions    Amoxicillin     Penicillins Rash     Past Medical History:   Diagnosis Date    Anxiety     Depression      Past Surgical History:   Procedure Laterality Date     SECTION      x3    FEMUR SURGERY      LAPAROSCOPIC LIGATION OF FALLOPIAN TUBE Bilateral 2023    Procedure: LIGATION, FALLOPIAN TUBE, LAPAROSCOPIC Bilateral;  Surgeon: Nathaniel Logan DO;  Location: [facility];  Service: OB/GYN;  Laterality: Bilateral;     Family History   Problem Relation Name Age of Onset    Hypertension Mother      Heart attack Father      Heart disease Paternal Grandfather       Social History     Tobacco Use    Smoking status: Every Day     Types: Vaping with nicotine     Start date: 2022    Smokeless tobacco: Never   Substance Use Topics    Alcohol use: Never    Drug use: Never     Review of Systems   Constitutional:  Negative for chills, diaphoresis, fatigue and fever.   HENT:  Positive for congestion and sneezing. Negative for drooling, ear discharge, ear pain, postnasal drip, rhinorrhea, sinus pressure, sinus pain, sore throat and tinnitus.    Eyes:  Negative for discharge.   Respiratory:  Positive for cough. Negative for chest tightness, shortness of breath and wheezing.    Cardiovascular:  Negative for chest pain and palpitations.   Gastrointestinal:  Negative for abdominal pain, diarrhea, nausea and  vomiting.   Genitourinary:  Negative for frequency, hematuria and urgency.   Musculoskeletal:  Negative for back pain, neck pain and neck stiffness.   Skin:  Negative for rash.   Neurological:  Negative for syncope, weakness, light-headedness, numbness and headaches.   Psychiatric/Behavioral:  Negative for suicidal ideas.        Physical Exam     Initial Vitals [01/08/25 1557]   BP Pulse Resp Temp SpO2   (!) 127/95 82 20 97.5 °F (36.4 °C) 98 %      MAP       --         Physical Exam    Nursing note and vitals reviewed.  Constitutional: She appears well-developed.   HENT: Mouth/Throat: Oropharynx is clear and moist.   Eyes: Conjunctivae are normal.   Neck:   Normal range of motion.  Cardiovascular:  Normal rate.           Pulmonary/Chest: Breath sounds normal. No respiratory distress.   Abdominal: She exhibits no distension.   Musculoskeletal:         General: Normal range of motion.      Cervical back: Normal range of motion.     Neurological: She is alert and oriented to person, place, and time.   Skin: Skin is warm and dry. No rash noted. No erythema.         ED Course   Procedures  Labs Reviewed   COVID/RSV/FLU A&B PCR - Normal       Result Value    Influenza A PCR Not Detected      Influenza B PCR Not Detected      Respiratory Syncytial Virus PCR Not Detected      SARS-CoV-2 PCR Not Detected      Narrative:     The Xpert Xpress SARS-CoV-2/FLU/RSV plus is a rapid, multiplexed real-time PCR test intended for the simultaneous qualitative detection and differentiation of SARS-CoV-2, Influenza A, Influenza B, and respiratory syncytial virus (RSV) viral RNA in either nasopharyngeal swab or nasal swab specimens.                Imaging Results    None          Medications - No data to display  Medical Decision Making  Medical Decision Making  Problem list/ differential diagnosis including but not limited to:  URI, pneumonia, bronchitis, flu, COVID, asthma, environmental irritant, GERD    Patient's chronic illnesses  impacting care:  none    Diagnostic test considered but not ordered:    My interpretations:      Radiology reports      Discussion of case with external qualified healthcare professionals:  Not applicable    Review of external notes( inpt, ems, NH, clinic):      Decision rules/scores:    Medications reviewed:  Medications ordered in the ER:  Discharge prescriptions:    Social variables possible impacting patient's healthcare:    Code status/discussion    Shared decision making:    Consideration for admission versus discharge: stable for discharge     Amount and/or Complexity of Data Reviewed  Labs:  Decision-making details documented in ED Course.               ED Course as of 01/08/25 1659 Wed Jan 08, 2025 1656 Influenza A, Molecular: Not Detected [WC]   1656 Influenza B, Molecular: Not Detected [WC]   1656 RSV Ag by Molecular Method: Not Detected [WC]   1656 SARS-CoV2 (COVID-19) Qualitative PCR: Not Detected []      ED Course User Index  [WC] Erlin Ortez MD                           Clinical Impression:  Final diagnoses:  [R05.9] Cough, unspecified type (Primary)          ED Disposition Condition    Discharge Stable          ED Prescriptions    None       Follow-up Information       Follow up With Specialties Details Why Contact Info    Lorrie Miner NP Family Medicine  As needed PO   [facility] [address]  [phone]               Erlin Ortez MD  01/08/25 1659

## 2025-01-08 NOTE — Clinical Note
"Rin"Rivera Shaver was seen and treated in our emergency department on 1/8/2025.  She may return to work on 01/09/2025.       If you have any questions or concerns, please don't hesitate to call.      Erlin Ortez MD"

## 2025-02-12 ENCOUNTER — HOSPITAL ENCOUNTER (EMERGENCY)
Facility: HOSPITAL | Age: 40
Discharge: HOME | End: 2025-02-12
Attending: STUDENT IN AN ORGANIZED HEALTH CARE EDUCATION/TRAINING PROGRAM
Payer: MEDICAID

## 2025-02-12 VITALS
DIASTOLIC BLOOD PRESSURE: 82 MMHG | SYSTOLIC BLOOD PRESSURE: 120 MMHG | HEART RATE: 63 BPM | RESPIRATION RATE: 18 BRPM | TEMPERATURE: 98 F | HEIGHT: 60 IN | OXYGEN SATURATION: 99 % | WEIGHT: 118.19 LBS | BODY MASS INDEX: 23.2 KG/M2

## 2025-02-12 DIAGNOSIS — S46.811A STRAIN OF RIGHT TRAPEZIUS MUSCLE, INITIAL ENCOUNTER: Primary | ICD-10-CM

## 2025-02-12 PROCEDURE — 99284 EMERGENCY DEPT VISIT MOD MDM: CPT | Mod: 25

## 2025-02-12 PROCEDURE — 63600175 PHARM REV CODE 636 W HCPCS: Mod: JZ,TB | Performed by: STUDENT IN AN ORGANIZED HEALTH CARE EDUCATION/TRAINING PROGRAM

## 2025-02-12 PROCEDURE — 96372 THER/PROPH/DIAG INJ SC/IM: CPT | Performed by: STUDENT IN AN ORGANIZED HEALTH CARE EDUCATION/TRAINING PROGRAM

## 2025-02-12 RX ORDER — KETOROLAC TROMETHAMINE 30 MG/ML
60 INJECTION, SOLUTION INTRAMUSCULAR; INTRAVENOUS
Status: COMPLETED | OUTPATIENT
Start: 2025-02-12 | End: 2025-02-12

## 2025-02-12 RX ORDER — KETOROLAC TROMETHAMINE 10 MG/1
10 TABLET, FILM COATED ORAL EVERY 6 HOURS PRN
Qty: 20 TABLET | Refills: 0 | Status: SHIPPED | OUTPATIENT
Start: 2025-02-12 | End: 2025-02-17

## 2025-02-12 RX ORDER — METHOCARBAMOL 500 MG/1
500 TABLET, FILM COATED ORAL 3 TIMES DAILY
Qty: 15 TABLET | Refills: 0 | Status: SHIPPED | OUTPATIENT
Start: 2025-02-12 | End: 2025-02-17

## 2025-02-12 RX ADMIN — KETOROLAC TROMETHAMINE 60 MG: 30 INJECTION, SOLUTION INTRAMUSCULAR at 12:02

## 2025-02-12 NOTE — Clinical Note
"Rin Donisa" Sivakumar was seen and treated in our emergency department on 2/12/2025.  She may return to work on 02/13/2025.       If you have any questions or concerns, please don't hesitate to call.       RN    "

## 2025-02-12 NOTE — ED PROVIDER NOTES
Encounter Date: 2025       History     Chief Complaint   Patient presents with    Shoulder Pain     HPI    39-year-old female presents emergency department for right shoulder pain.  States she is works at a [employer] facility and has been lifting a lot.  Tried some stretch has been no improvement.  Took ibuprofen two days ago with no improvement.    Review of patient's allergies indicates:   Allergen Reactions    Amoxicillin     Penicillins Rash     Past Medical History:   Diagnosis Date    Anxiety     Depression      Past Surgical History:   Procedure Laterality Date     SECTION      x3    FEMUR SURGERY      LAPAROSCOPIC LIGATION OF FALLOPIAN TUBE Bilateral 2023    Procedure: LIGATION, FALLOPIAN TUBE, LAPAROSCOPIC Bilateral;  Surgeon: Nathaniel Logan DO;  Location: [facility];  Service: OB/GYN;  Laterality: Bilateral;     Family History   Problem Relation Name Age of Onset    Hypertension Mother      Heart attack Father      Heart disease Paternal Grandfather       Social History     Tobacco Use    Smoking status: Every Day     Types: Vaping with nicotine     Start date: 2022    Smokeless tobacco: Never   Substance Use Topics    Alcohol use: Never    Drug use: Never     Review of Systems   Constitutional:  Negative for fever.   Respiratory:  Negative for cough.    Cardiovascular:  Negative for chest pain.   Gastrointestinal:  Negative for abdominal pain, constipation, diarrhea, nausea and vomiting.   Musculoskeletal:  Positive for myalgias.   Neurological:  Negative for headaches.   All other systems reviewed and are negative.      Physical Exam     Initial Vitals [25 0044]   BP Pulse Resp Temp SpO2   120/82 63 18 98.1 °F (36.7 °C) 99 %      MAP       --         Physical Exam    Nursing note and vitals reviewed.  Constitutional: She appears well-developed and well-nourished. No distress.   Cardiovascular:  Normal rate and regular rhythm.           Pulmonary/Chest: Breath  sounds normal. No respiratory distress. She has no wheezes. She has no rhonchi. She has no rales.   Abdominal: Abdomen is soft. There is no abdominal tenderness. There is no rebound and no guarding.   Musculoskeletal:         General: Tenderness (tenderness to the right trapezius muscle more so to the.  Lateral aspect of the trapezius with active muscle spasm) present. Normal range of motion.     Neurological: She is alert and oriented to person, place, and time. She has normal strength.   Skin: Skin is warm. Capillary refill takes less than 2 seconds.         ED Course   Procedures  Labs Reviewed   PREGNANCY TEST, URINE RAPID          Imaging Results    None          Medications   ketorolac injection 60 mg (has no administration in time range)     Medical Decision Making  Initial Assessment:       Muscle strain      Differential Diagnosis:   Judging by the patient's chief complaint and pertinent history, the patient has the following possible differential diagnoses, including but not limited to the following.  Some of these are deemed to be lower likelihood and some more likely based on my physical exam and history combined with possible lab work and/or imaging studies.   Please see the pertinent studies, and refer to the HPI.  Some of these diagnoses will take further evaluation to fully rule out, perhaps as an outpatient and the patient was encouraged to follow up when discharged for more comprehensive evaluation.      Muscle spasm, muscle strain, shoulder pain,  as well as multiple other possible etiologies      Problems Addressed:  Strain of right trapezius muscle, initial encounter: acute illness or injury    Risk  Prescription drug management.                                      Clinical Impression:  Final diagnoses:  [T08.323A] Strain of right trapezius muscle, initial encounter (Primary)          ED Disposition Condition    Discharge Stable          ED Prescriptions       Medication Sig Dispense Start Date  End Date Auth. Provider    ketorolac (TORADOL) 10 mg tablet Take 1 tablet (10 mg total) by mouth every 6 (six) hours as needed for Pain. 20 tablet 2/12/2025 2/17/2025 Heron Cali MD    methocarbamoL (ROBAXIN) 500 MG Tab Take 1 tablet (500 mg total) by mouth 3 (three) times daily. for 5 days 15 tablet 2/12/2025 2/17/2025 Heron Cali MD          Follow-up Information       Follow up With Specialties Details Why Contact Info    Lorrie Miner NP Family Medicine Schedule an appointment as soon as possible for a visit   PO   [facility] [address]  [phone]      [facility] Orthopaedics - Emergency Dept Emergency Medicine Go to  If symptoms worsen [id number]  [facility] [id number] [phone]             Heron Cali MD  02/12/25 0051

## 2025-02-27 ENCOUNTER — HOSPITAL ENCOUNTER (EMERGENCY)
Facility: HOSPITAL | Age: 40
Discharge: HOME | End: 2025-02-27
Attending: STUDENT IN AN ORGANIZED HEALTH CARE EDUCATION/TRAINING PROGRAM
Payer: MEDICAID

## 2025-02-27 VITALS
OXYGEN SATURATION: 99 % | SYSTOLIC BLOOD PRESSURE: 110 MMHG | WEIGHT: 118.19 LBS | TEMPERATURE: 99 F | BODY MASS INDEX: 23.2 KG/M2 | HEIGHT: 60 IN | HEART RATE: 60 BPM | RESPIRATION RATE: 18 BRPM | DIASTOLIC BLOOD PRESSURE: 72 MMHG

## 2025-02-27 DIAGNOSIS — L03.039 PARONYCHIA OF GREAT TOE: ICD-10-CM

## 2025-02-27 DIAGNOSIS — S90.111A CONTUSION OF RIGHT GREAT TOE WITHOUT DAMAGE TO NAIL, INITIAL ENCOUNTER: Primary | ICD-10-CM

## 2025-02-27 PROCEDURE — 99283 EMERGENCY DEPT VISIT LOW MDM: CPT | Mod: 25

## 2025-02-27 RX ORDER — SULFAMETHOXAZOLE AND TRIMETHOPRIM 800; 160 MG/1; MG/1
1 TABLET ORAL 2 TIMES DAILY
Qty: 14 TABLET | Refills: 0 | Status: SHIPPED | OUTPATIENT
Start: 2025-02-27 | End: 2025-03-06

## 2025-02-27 NOTE — ED TRIAGE NOTES
Pt having r great toe pain for over a year. Pt had someone step on her r great toe yesterday worsening the pain

## 2025-02-27 NOTE — ED PROVIDER NOTES
Encounter Date: 2025       History     Chief Complaint   Patient presents with    Toe Injury     HPI    39-year-old female presents emergency department for right great toe pain.  States she has been having pain in her toe over last year because we ingrown toenail.  States she turned it at home.  She states that somebody stepped on it today and now it is very painful.  Concern for ingrown toenail.    Review of patient's allergies indicates:   Allergen Reactions    Amoxicillin     Penicillins Rash     Past Medical History:   Diagnosis Date    Anxiety     Depression      Past Surgical History:   Procedure Laterality Date     SECTION      x3    FEMUR SURGERY      LAPAROSCOPIC LIGATION OF FALLOPIAN TUBE Bilateral 2023    Procedure: LIGATION, FALLOPIAN TUBE, LAPAROSCOPIC Bilateral;  Surgeon: Nathaniel Logan DO;  Location: [facility];  Service: OB/GYN;  Laterality: Bilateral;     Family History   Problem Relation Name Age of Onset    Hypertension Mother      Heart attack Father      Heart disease Paternal Grandfather       Social History[1]  Review of Systems   Constitutional:  Negative for fever.   Respiratory:  Negative for cough.    Cardiovascular:  Negative for chest pain.   Gastrointestinal:  Negative for abdominal pain, constipation, diarrhea, nausea and vomiting.   Skin:  Positive for wound.   Neurological:  Negative for headaches.   All other systems reviewed and are negative.      Physical Exam     Initial Vitals [25 0102]   BP Pulse Resp Temp SpO2   110/72 60 18 98.6 °F (37 °C) 99 %      MAP       --         Physical Exam    Nursing note and vitals reviewed.  Constitutional: She appears well-developed and well-nourished. No distress.   Cardiovascular:  Normal rate and regular rhythm.           Pulmonary/Chest: Breath sounds normal. No respiratory distress. She has no wheezes. She has no rhonchi. She has no rales.   Abdominal: Abdomen is soft. There is no abdominal tenderness. There is  no rebound and no guarding.   Musculoskeletal:         General: No tenderness. Normal range of motion.     Neurological: She is alert and oriented to person, place, and time. She has normal strength.   Skin: Skin is warm. Capillary refill takes less than 2 seconds.   Contusion to the right great toe.  Inflammation to the paronychia of the toe.  No appreciable abscess         ED Course   Procedures  Labs Reviewed - No data to display       Imaging Results              X-Ray Toe 2 or More Views Right (In process)  Result time 02/27/25 01:27:42                     Medications - No data to display  Medical Decision Making  Initial Assessment:       Toe pain      Differential Diagnosis:   Judging by the patient's chief complaint and pertinent history, the patient has the following possible differential diagnoses, including but not limited to the following.  Some of these are deemed to be lower likelihood and some more likely based on my physical exam and history combined with possible lab work and/or imaging studies.   Please see the pertinent studies, and refer to the HPI.  Some of these diagnoses will take further evaluation to fully rule out, perhaps as an outpatient and the patient was encouraged to follow up when discharged for more comprehensive evaluation.      Contusion, fracture, paronychia, ingrown toenail,  as well as multiple other possible etiologies      Amount and/or Complexity of Data Reviewed  Radiology: ordered.    Risk  Prescription drug management.               ED Course as of 02/27/25 0137   Thu Feb 27, 2025   0136 Patient had tubal plus on Depo for bleeding [BS]      ED Course User Index  [BS] Heron Cali MD                           Clinical Impression:  Final diagnoses:  [S90.111A] Contusion of right great toe without damage to nail, initial encounter (Primary)  [L03.039] Paronychia of great toe          ED Disposition Condition    Discharge Stable          ED Prescriptions        Medication Sig Dispense Start Date End Date Auth. Provider    sulfamethoxazole-trimethoprim 800-160mg (BACTRIM DS) 800-160 mg Tab Take 1 tablet by mouth 2 (two) times daily. for 7 days 14 tablet 2/27/2025 3/6/2025 Heron Cali MD          Follow-up Information       Follow up With Specialties Details Why Contact Info    Lorrie Miner, NP Family Medicine Schedule an appointment as soon as possible for a visit   [address]   [facility] [address]  [phone]      [facility] Orthopaedics - Emergency Dept Emergency Medicine Go to  If symptoms worsen [address]  [facility] [id number] [phone]               [1]   Social History  Tobacco Use    Smoking status: Every Day     Types: Vaping with nicotine     Start date: 12/19/2022    Smokeless tobacco: Never   Substance Use Topics    Alcohol use: Never    Drug use: Never        Heron Cali MD  02/27/25 0137

## 2025-02-27 NOTE — Clinical Note
"Rin Donisa" Sivakumar was seen and treated in our emergency department on 2/27/2025.  She may return to work on 02/28/2025.       If you have any questions or concerns, please don't hesitate to call.       RN    "

## 2025-03-05 ENCOUNTER — HOSPITAL ENCOUNTER (EMERGENCY)
Facility: HOSPITAL | Age: 40
Discharge: HOME | End: 2025-03-05
Attending: EMERGENCY MEDICINE
Payer: MEDICAID

## 2025-03-05 VITALS
HEIGHT: 60 IN | RESPIRATION RATE: 20 BRPM | WEIGHT: 112 LBS | OXYGEN SATURATION: 99 % | DIASTOLIC BLOOD PRESSURE: 100 MMHG | HEART RATE: 67 BPM | BODY MASS INDEX: 21.99 KG/M2 | TEMPERATURE: 98 F | SYSTOLIC BLOOD PRESSURE: 158 MMHG

## 2025-03-05 DIAGNOSIS — L03.039 PARONYCHIA OF GREAT TOE: ICD-10-CM

## 2025-03-05 DIAGNOSIS — L60.0 INGROWN TOENAIL: ICD-10-CM

## 2025-03-05 DIAGNOSIS — M79.674 GREAT TOE PAIN, RIGHT: Primary | ICD-10-CM

## 2025-03-05 PROCEDURE — 99284 EMERGENCY DEPT VISIT MOD MDM: CPT | Mod: 25

## 2025-03-05 PROCEDURE — 25000003 PHARM REV CODE 250: Performed by: PHYSICIAN ASSISTANT

## 2025-03-05 PROCEDURE — 10060 I&D ABSCESS SIMPLE/SINGLE: CPT

## 2025-03-05 PROCEDURE — 63600175 PHARM REV CODE 636 W HCPCS: Performed by: PHYSICIAN ASSISTANT

## 2025-03-05 RX ORDER — DOXYCYCLINE 100 MG/1
100 CAPSULE ORAL 2 TIMES DAILY
Qty: 20 CAPSULE | Refills: 0 | Status: SHIPPED | OUTPATIENT
Start: 2025-03-05 | End: 2025-03-15

## 2025-03-05 RX ORDER — LIDOCAINE HYDROCHLORIDE 10 MG/ML
5 INJECTION, SOLUTION INFILTRATION; PERINEURAL
Status: COMPLETED | OUTPATIENT
Start: 2025-03-05 | End: 2025-03-05

## 2025-03-05 RX ORDER — MUPIROCIN 20 MG/G
OINTMENT TOPICAL DAILY
Qty: 22 G | Refills: 0 | Status: SHIPPED | OUTPATIENT
Start: 2025-03-05 | End: 2025-03-15

## 2025-03-05 RX ORDER — ONDANSETRON 4 MG/1
4 TABLET, FILM COATED ORAL EVERY 6 HOURS
Qty: 12 TABLET | Refills: 0 | Status: SHIPPED | OUTPATIENT
Start: 2025-03-05

## 2025-03-05 RX ORDER — ONDANSETRON 4 MG/1
8 TABLET, ORALLY DISINTEGRATING ORAL
Status: COMPLETED | OUTPATIENT
Start: 2025-03-05 | End: 2025-03-05

## 2025-03-05 RX ORDER — IBUPROFEN 800 MG/1
800 TABLET ORAL 3 TIMES DAILY
Qty: 30 TABLET | Refills: 0 | Status: SHIPPED | OUTPATIENT
Start: 2025-03-05

## 2025-03-05 RX ADMIN — LIDOCAINE HYDROCHLORIDE 5 ML: 10 INJECTION, SOLUTION INFILTRATION; PERINEURAL at 08:03

## 2025-03-05 RX ADMIN — ONDANSETRON 8 MG: 4 TABLET, ORALLY DISINTEGRATING ORAL at 08:03

## 2025-03-05 NOTE — Clinical Note
"Rin "Rivera Shaver was seen and treated in our emergency department on 3/5/2025.  She may return to work on 03/08/2025.  Please excuse for up to 48hours prior for symptoms present at that time if possible. Patient may also return sooner than above date if symptoms improve/resolve.    Allow accommodations with shoes     If you have any questions or concerns, please don't hesitate to call.      Krista Freeman PA"

## 2025-03-06 NOTE — DISCHARGE INSTRUCTIONS
LEAVE on the dressing we applied for 24-48hours. Remove. Wash with DIAL soap and warm running water. DO NOT SOAK the area at all. Do not bathe- SHOWER only (basically do not soak the wound). If you notice any OOZING, REDNESS, or RUN FEVER >100.4F RETURN TO CLINIC. CHANGE DRESSING DAILY. DO NOT USE: peroxide, alcohol, or anything other than dial soap and water.    If the area gets dirty, clean it, dry it, wrap it. Leave it opened to air when watching tv.     You must call podiatrist and set up an appointment ASAP

## 2025-03-06 NOTE — ED PROVIDER NOTES
Encounter Date: 3/5/2025       History     Chief Complaint   Patient presents with    Toe Pain     Pt to er with right great toe pain x 1 week, currently on abx      See MDM for details.      The history is provided by the patient. No  was used.     Review of patient's allergies indicates:   Allergen Reactions    Amoxicillin     Penicillins Rash     Past Medical History:   Diagnosis Date    Anxiety     Depression      Past Surgical History:   Procedure Laterality Date     SECTION      x3    FEMUR SURGERY      LAPAROSCOPIC LIGATION OF FALLOPIAN TUBE Bilateral 2023    Procedure: LIGATION, FALLOPIAN TUBE, LAPAROSCOPIC Bilateral;  Surgeon: Nathaniel Logan DO;  Location: [facility];  Service: OB/GYN;  Laterality: Bilateral;     Family History   Problem Relation Name Age of Onset    Hypertension Mother      Heart attack Father      Heart disease Paternal Grandfather       Social History[1]  Review of Systems   Constitutional: Negative.    HENT: Negative.     Eyes: Negative.    Respiratory: Negative.     Cardiovascular: Negative.    Gastrointestinal: Negative.    Genitourinary: Negative.    Musculoskeletal: Negative.    Skin:  Positive for wound.   Neurological: Negative.        Physical Exam     Initial Vitals [25]   BP Pulse Resp Temp SpO2   (!) 158/100 67 20 98 °F (36.7 °C) 99 %      MAP       --         Physical Exam    Nursing note and vitals reviewed.  Constitutional: She appears well-developed and well-nourished. No distress.   HENT:   Head: Normocephalic and atraumatic.   Eyes: EOM and lids are normal. Pupils are equal, round, and reactive to light.   Neck: Neck supple.   Normal range of motion.  Cardiovascular:  Normal rate and regular rhythm.           Pulmonary/Chest: Effort normal and breath sounds normal.   Abdominal: Abdomen is flat.   Musculoskeletal:      Cervical back: Normal range of motion and neck supple.     Neurological: She is alert and oriented to  person, place, and time. She has normal strength. She is not disoriented. No cranial nerve deficit or sensory deficit. GCS eye subscore is 4. GCS verbal subscore is 5. GCS motor subscore is 6.   Skin: Skin is warm and intact. Rash noted. Rash is pustular.        Crusting purulent drainage right grade 2 medial nailbed.  Erythema noted.  Paronychia noted.   Psychiatric: She has a normal mood and affect. Her speech is normal and behavior is normal. Judgment and thought content normal. Cognition and memory are normal.         ED Course   I & D - Incision and Drainage    Date/Time: 3/5/2025 8:45 PM  Location procedure was performed: [facility] EMERGENCY DEPARTMENT    Performed by: Krista Freeman PA  Authorized by: Krista Freeman PA  Consent Done: Emergent Situation  Type: abscess  Body area: lower extremity  Location details: right big toe  Anesthesia: local infiltration    Anesthesia:  Local Anesthetic: lidocaine 1% without epinephrine  Anesthetic total: 3 mL    Patient sedated: no  Description of findings: purulent drainage   Incision type: single straight  Incision depth: dermal  Complexity: simple  Drainage: serosanguinous  Drainage amount: scant  Wound treatment: incision, drainage, expression of material and wound left open  Packing material: 1/4 in gauze  Complications: No  Estimated blood loss (mL): 2  Specimens: No  Implants: No  Patient tolerance: Patient tolerated the procedure well with no immediate complications    Incision depth: dermal        Labs Reviewed   PREGNANCY TEST, URINE RAPID          Imaging Results    None          Medications   LIDOcaine HCL 10 mg/ml (1%) injection 5 mL (5 mLs Infiltration Given 3/5/25 2048)   ondansetron disintegrating tablet 8 mg (8 mg Oral Given 3/5/25 2048)     Medical Decision Making  39yoWF w/hx of anxiety and depression presents to the emergency department with right great toe pain for the last week.  Patient seen here few days ago for similar complaint.  Patient  given antibiotics at the time and treated for paronychia.  Patient says somebody also stepped on her toe at work and made the redness worse.  Patient states she has been using regular soap and not die also because it down so it gives her yeast infection.  Patient states she was cleaning it with peroxide as well.    Problems Addressed:  Great toe pain, right: chronic illness or injury with exacerbation, progression, or side effects of treatment     Details: Differential diagnosis included but not limited to:  Ingrown toenail, paronychia, cellulitis     Erythema just surrounding the nailbed.  No obvious cellulitis to the foot foot otherwise.  Purulent drainage noted with crusting near nailbed.  Scant purulent drainage expressed with some lidocaine and needle decompression.  Patient counseled on how to properly clean the wound.  She states somebody also stepped on her toe at work which probably exacerbated these symptoms.  Patient needs to follow directions as discussed. If not cleaned properly, it can delay healing and cause more infection.  Patient will be referred to podiatrist. All questions asked and answered at the time of the visit. Strict ER precautions given. Patient and family members agreeable to plan.       Risk  Prescription drug management.               ED Course as of 03/05/25 2107   Wed Mar 05, 2025   2047 Decline pregnancy test. Understands risks associated with this and that medications can cause harm to unborn child.  [ST]      ED Course User Index  [ST] Krista Freeman PA                           Clinical Impression:  Final diagnoses:  [M79.674] Great toe pain, right (Primary)  [L60.0] Ingrown toenail  [L03.039] Paronychia of great toe          ED Disposition Condition    Discharge Stable          ED Prescriptions       Medication Sig Dispense Start Date End Date Auth. Provider    doxycycline (VIBRAMYCIN) 100 MG Cap Take 1 capsule (100 mg total) by mouth 2 (two) times daily. Take with meal  and large glass of water for 10 days 20 capsule 3/5/2025 3/15/2025 Krista Freeman PA    mupirocin (BACTROBAN) 2 % ointment Apply topically once daily. With dressing changes for 10 days 22 g 3/5/2025 3/15/2025 Krista Freeman PA    ondansetron (ZOFRAN) 4 MG tablet Take 1 tablet (4 mg total) by mouth every 6 (six) hours. 12 tablet 3/5/2025 -- Krista Freeman PA    ibuprofen (ADVIL,MOTRIN) 800 MG tablet Take 1 tablet (800 mg total) by mouth 3 (three) times daily. 30 tablet 3/5/2025 -- Krista Freeman PA          Follow-up Information       Follow up With Specialties Details Why Contact Info    [facility] Orthopaedics - Emergency Dept Emergency Medicine  As needed, If symptoms worsen [address]  [facility] [id number] [phone]    Lorrie Miner NP Family Medicine Schedule an appointment as soon as possible for a visit today  [address]   [facility] [address] [phone]      [facility] - Podiatry Podiatry Call   [address] [place] [id number]        This note was typed partially using voice recognition software.  Please be reminded that not all corrections/addendums to grammar may have been made prior to closing of this chart.         [1]   Social History  Tobacco Use    Smoking status: Every Day     Types: Vaping with nicotine     Start date: 12/19/2022    Smokeless tobacco: Never   Substance Use Topics    Alcohol use: Never    Drug use: Never        Krista Freeman PA  03/05/25 7688

## 2025-04-20 ENCOUNTER — HOSPITAL ENCOUNTER (EMERGENCY)
Facility: HOSPITAL | Age: 40
Discharge: HOME | End: 2025-04-20
Attending: EMERGENCY MEDICINE
Payer: MEDICAID

## 2025-04-20 VITALS
SYSTOLIC BLOOD PRESSURE: 113 MMHG | RESPIRATION RATE: 20 BRPM | DIASTOLIC BLOOD PRESSURE: 68 MMHG | TEMPERATURE: 97 F | HEART RATE: 65 BPM | WEIGHT: 120.81 LBS | OXYGEN SATURATION: 99 % | BODY MASS INDEX: 23.72 KG/M2 | HEIGHT: 60 IN

## 2025-04-20 DIAGNOSIS — R21 RASH: ICD-10-CM

## 2025-04-20 DIAGNOSIS — B35.9 RINGWORM: Primary | ICD-10-CM

## 2025-04-20 PROCEDURE — 96372 THER/PROPH/DIAG INJ SC/IM: CPT | Performed by: PHYSICIAN ASSISTANT

## 2025-04-20 PROCEDURE — 99284 EMERGENCY DEPT VISIT MOD MDM: CPT | Mod: 25

## 2025-04-20 PROCEDURE — 63600175 PHARM REV CODE 636 W HCPCS: Mod: JZ,TB | Performed by: PHYSICIAN ASSISTANT

## 2025-04-20 RX ORDER — METHYLPREDNISOLONE SOD SUCC 125 MG
125 VIAL (EA) INJECTION
Status: COMPLETED | OUTPATIENT
Start: 2025-04-20 | End: 2025-04-20

## 2025-04-20 RX ORDER — PREDNISONE 20 MG/1
40 TABLET ORAL DAILY
Qty: 10 TABLET | Refills: 0 | Status: SHIPPED | OUTPATIENT
Start: 2025-04-20 | End: 2025-04-25

## 2025-04-20 RX ORDER — KETOCONAZOLE 20 MG/G
CREAM TOPICAL DAILY
Qty: 15 G | Refills: 0 | Status: SHIPPED | OUTPATIENT
Start: 2025-04-20 | End: 2025-04-27

## 2025-04-20 RX ADMIN — METHYLPREDNISOLONE SODIUM SUCCINATE 125 MG: 125 INJECTION, POWDER, FOR SOLUTION INTRAMUSCULAR; INTRAVENOUS at 04:04

## 2025-04-20 NOTE — ED PROVIDER NOTES
Encounter Date: 2025       History     Chief Complaint   Patient presents with    Rash     Rash on chest, back.  Ringworm on right inner wrist       40-year-old female presents to ED for evaluation of generalized rash on her right side back chest legs and arms.  Patient reports that she was outside working in her yd and has been having itching since yesterday worsening today.  Reports that she also has recently treated her animals at home for fleas.  States she is unsure with the rash is from.  Reports that she also has a area of ringworm to her right wrist that she has been using over-the-counter Lamisil without relief for the last 2 weeks.    The history is provided by the patient. No  was used.     Review of patient's allergies indicates:   Allergen Reactions    Amoxicillin     Penicillins Rash     Past Medical History:   Diagnosis Date    Anxiety     Depression      Past Surgical History:   Procedure Laterality Date     SECTION      x3    FEMUR SURGERY      LAPAROSCOPIC LIGATION OF FALLOPIAN TUBE Bilateral 2023    Procedure: LIGATION, FALLOPIAN TUBE, LAPAROSCOPIC Bilateral;  Surgeon: Nathaniel Logan DO;  Location: [facility];  Service: OB/GYN;  Laterality: Bilateral;     Family History   Problem Relation Name Age of Onset    Hypertension Mother      Heart attack Father      Heart disease Paternal Grandfather       Social History[1]  Review of Systems   Constitutional:  Negative for chills, fatigue and fever.   HENT:  Negative for sore throat.    Respiratory:  Negative for shortness of breath.    Cardiovascular:  Negative for chest pain.   Gastrointestinal:  Negative for abdominal pain, nausea and vomiting.   Genitourinary:  Negative for dysuria.   Musculoskeletal:  Negative for back pain.   Skin:  Positive for rash. Negative for wound.   Neurological:  Negative for weakness.   Hematological:  Does not bruise/bleed easily.       Physical Exam     Initial Vitals [25  1609]   BP Pulse Resp Temp SpO2   113/68 65 20 97.2 °F (36.2 °C) 99 %      MAP       --         Physical Exam    Nursing note and vitals reviewed.  Constitutional: She appears well-developed and well-nourished.   HENT:   Head: Normocephalic and atraumatic.   Right Ear: Tympanic membrane and external ear normal.   Left Ear: Tympanic membrane and external ear normal. Mouth/Throat: Uvula is midline, oropharynx is clear and moist and mucous membranes are normal. No trismus in the jaw. No uvula swelling. No oropharyngeal exudate, posterior oropharyngeal edema or posterior oropharyngeal erythema.   Eyes: Conjunctivae are normal. Pupils are equal, round, and reactive to light.   Neck: Neck supple.   Normal range of motion.  Cardiovascular:  Normal rate, regular rhythm and normal heart sounds.           Pulmonary/Chest: Breath sounds normal. She has no wheezes. She has no rhonchi. She has no rales.   Abdominal: Abdomen is soft. Bowel sounds are normal. There is no abdominal tenderness.   Musculoskeletal:         General: Normal range of motion.      Cervical back: Normal range of motion and neck supple.     Neurological: She is alert and oriented to person, place, and time.   Skin: Skin is warm and dry.   Generalized rash noted to body.  Area to her right wrist and left groin consistent with tinea   Psychiatric: She has a normal mood and affect.         ED Course   Procedures  Labs Reviewed - No data to display       Imaging Results    None          Medications   methylPREDNISolone sodium succinate injection 125 mg (125 mg Intramuscular Given 4/20/25 1635)     Medical Decision Making  40-year-old female presents to ED for evaluation of generalized rash on her right side back chest legs and arms.  Patient reports that she was outside working in her yd and has been having itching since yesterday worsening today.  Reports that she also has recently treated her animals at home for fleas.  States she is unsure with the rash is  from.  Reports that she also has a area of ringworm to her right wrist that she has been using over-the-counter Lamisil without relief for the last 2 weeks.    Differential diagnosis includes but isn't limited to rash, ringworm, allergic reaction, contact dermatitis, flea bites    Amount and/or Complexity of Data Reviewed  Discussion of management or test interpretation with external provider(s): Patient afebrile and in no acute distress presents to ED for evaluation of rash to her body.  Patient reports it started yesterday in his itching and worsened.  States she was recently outside in her yd as well as having animals with fleas.  States she sat in the bathtub with her dog for 3 hours removing police recently.  No erythema or drainage noted.  No cellulitis noted.  Ringworm noted to her right inner wrist.  Steroid shot given here for itching.  Offered patient Benadryl.  Patient declined Benadryl due to not wanting to be sleepy as she is states she has children to take care of.  Will discharge home with short course of steroids and antifungal cream.  Discussed follow up with PCP and possibly Dermatology as needed.  Patient verbalizes understanding and agrees with plan of care.    Risk  OTC drugs.  Prescription drug management.                                      Clinical Impression:  Final diagnoses:  [B35.9] Ringworm (Primary)  [R21] Rash          ED Disposition Condition    Discharge Stable          ED Prescriptions       Medication Sig Dispense Start Date End Date Auth. Provider    predniSONE (DELTASONE) 20 MG tablet Take 2 tablets (40 mg total) by mouth once daily. for 5 days 10 tablet 4/20/2025 4/25/2025 Gricelda Gomez PA    ketoconazole (NIZORAL) 2 % cream Apply topically once daily. for 7 days 15 g 4/20/2025 4/27/2025 Gricelda Gomez PA          Follow-up Information       Follow up With Specialties Details Why Contact Info    Lorrie Miner NP Family Medicine   [address]   [facility] [address] [phone]                  [1]   Social History  Tobacco Use    Smoking status: Every Day     Types: Vaping with nicotine     Start date: 12/19/2022    Smokeless tobacco: Never   Substance Use Topics    Alcohol use: Never    Drug use: Never        Gricelda Gomez PA  04/20/25 170

## 2025-04-20 NOTE — DISCHARGE INSTRUCTIONS
Take steroids for the next 5 days.  May use Benadryl as needed for itching.  May take Zyrtec or Allegra as well for itching.  Apply topical antifungal cream to rewarm.  Follow up with PCP in 5-7 days as needed

## 2025-05-05 ENCOUNTER — OFFICE VISIT (OUTPATIENT)
Dept: FAMILY MEDICINE | Facility: CLINIC | Age: 40
End: 2025-05-05
Payer: MEDICAID

## 2025-05-05 VITALS
RESPIRATION RATE: 18 BRPM | HEIGHT: 60 IN | TEMPERATURE: 99 F | BODY MASS INDEX: 23.16 KG/M2 | OXYGEN SATURATION: 99 % | WEIGHT: 118 LBS | SYSTOLIC BLOOD PRESSURE: 130 MMHG | HEART RATE: 99 BPM | DIASTOLIC BLOOD PRESSURE: 99 MMHG

## 2025-05-05 DIAGNOSIS — B35.4 TINEA CORPORIS: ICD-10-CM

## 2025-05-05 DIAGNOSIS — M79.674 GREAT TOE PAIN, RIGHT: ICD-10-CM

## 2025-05-05 DIAGNOSIS — L60.0 ONYCHOCRYPTOSIS: Primary | ICD-10-CM

## 2025-05-05 DIAGNOSIS — L60.0 INGROWN TOENAIL: ICD-10-CM

## 2025-05-05 PROCEDURE — 99213 OFFICE O/P EST LOW 20 MIN: CPT | Mod: PBBFAC

## 2025-05-05 RX ORDER — KETOCONAZOLE 20 MG/G
CREAM TOPICAL DAILY
Qty: 60 G | Refills: 2 | Status: SHIPPED | OUTPATIENT
Start: 2025-05-05

## 2025-05-05 RX ORDER — MUPIROCIN 20 MG/G
OINTMENT TOPICAL 3 TIMES DAILY
Qty: 30 G | Refills: 0 | Status: SHIPPED | OUTPATIENT
Start: 2025-05-05

## 2025-05-05 RX ORDER — MUPIROCIN 20 MG/G
OINTMENT TOPICAL 3 TIMES DAILY
Qty: 30 G | Refills: 0 | Status: SHIPPED | OUTPATIENT
Start: 2025-05-05 | End: 2025-05-05

## 2025-05-05 NOTE — PROGRESS NOTES
"Subjective:       Patient ID: Rin Shaver is a 40 y.o. female.    Chief Complaint: ingrown toenail    HPI  39 yo female with right great ingrown toenail.  Improved from previous.  No longer having exudate. Still mildly TTP.  No surrounding erythema or heat.  Also has rash on right axilla and trunk.  Improved on ketoconazole cream.  No longer has any.   Review of Systems   Integumentary:         As above         Objective:       Vital Signs  Temp: 98.6 °F (37 °C)  Pulse: 99  Resp: 18  SpO2: 99 %  BP: (!) 130/99  Patient Position: Sitting  Height and Weight  Height: 4' 11.84" (152 cm)  Weight: 53.5 kg (118 lb)  BSA (Calculated - sq m): 1.5 sq meters  BMI (Calculated): 23.2  Weight in (lb) to have BMI = 25: 127.1]  Physical Exam  Vitals reviewed.   Constitutional:       Appearance: Normal appearance.   HENT:      Head: Normocephalic and atraumatic.   Eyes:      Extraocular Movements: Extraocular movements intact.      Conjunctiva/sclera: Conjunctivae normal.   Skin:     Comments: Right great toe- Nail is normal in appearance. No surrounding erythema, edema, heat, or exudate.  Mild lateral TTP.  Right axilla- rough raised eruptions. No color change.    Neurological:      General: No focal deficit present.      Mental Status: She is alert.   Psychiatric:         Mood and Affect: Mood and affect normal.         Speech: Speech normal.         Behavior: Behavior normal. Behavior is cooperative.         Thought Content: Thought content does not include homicidal or suicidal ideation.         Assessment:       Problem List Items Addressed This Visit    None  Visit Diagnoses         Onychocryptosis    -  Primary    Relevant Medications    mupirocin (BACTROBAN) 2 % ointment      Tinea corporis        Relevant Medications    ketoconazole (NIZORAL) 2 % cream            Plan:   Encouraged antihistamines as needed  Encouraged warm Epsom salt soaks for toe and application or petroleum jelly daily  ER Precautions  FU in " 1 month

## (undated) DEVICE — PACK GYN LAPAROSCOPY HZD

## (undated) DEVICE — SUT MCRYL PLUS 4-0 PS2 27IN

## (undated) DEVICE — GLOVE PROTEXIS LTX MICRO 6.5

## (undated) DEVICE — Device

## (undated) DEVICE — NDL HYPODERMIC BLUNT 18G 1.5IN

## (undated) DEVICE — NDL SAFETY 22G X 1.5 ECLIPSE

## (undated) DEVICE — JELLY SURGILUBE LUBE TUBE 2OZ

## (undated) DEVICE — GLOVE PROTEXIS BLUE LATEX 6.5

## (undated) DEVICE — DRAPE UTILITY W/ TAPE 20X30IN

## (undated) DEVICE — SOL ELECTROLUBE ANTI-STIC

## (undated) DEVICE — SEE MEDLINE ITEM 154981

## (undated) DEVICE — TRAY SKIN SCRUB WET PREMIUM

## (undated) DEVICE — TROCAR ENDOPATH XCEL 5X100MM

## (undated) DEVICE — CANNULA ENDOPATH XCEL 5X100MM

## (undated) DEVICE — ADHESIVE DERMABOND ADVANCED

## (undated) DEVICE — CABLE BIPOLAR HIGH FREQUENCY

## (undated) DEVICE — GLOVE PROTEXIS HYDROGEL SZ7.5

## (undated) DEVICE — APPLICATOR STRL COT 2INNR 6IN